# Patient Record
Sex: MALE | Race: WHITE | NOT HISPANIC OR LATINO | Employment: FULL TIME | URBAN - METROPOLITAN AREA
[De-identification: names, ages, dates, MRNs, and addresses within clinical notes are randomized per-mention and may not be internally consistent; named-entity substitution may affect disease eponyms.]

---

## 2017-01-13 DIAGNOSIS — C49.9 SARCOMA (H): Primary | ICD-10-CM

## 2017-03-30 ENCOUNTER — OFFICE VISIT (OUTPATIENT)
Dept: ORTHOPEDICS | Facility: CLINIC | Age: 52
End: 2017-03-30

## 2017-03-30 VITALS — WEIGHT: 169.2 LBS | HEIGHT: 69 IN | BODY MASS INDEX: 25.06 KG/M2

## 2017-03-30 DIAGNOSIS — C49.11 SOFT TISSUE SARCOMA OF UPPER EXTREMITY, RIGHT (H): Primary | ICD-10-CM

## 2017-03-30 NOTE — PROGRESS NOTES
[facility], Orthopaedic Oncology Surgery  by Raleigh Alvarado M.D.    Alphonso Hicks MRN# [id number]   Age: 50 year old YOB: 1965     Requesting physician: Mira Eid MD Gen Surgery, MD Prieto Valencia MD, PCP       DX:  1. High grade myxofibrosarcoma, Right arm. Tumor size is 3 cm, superficial, high grade    TREATMENTS:  1. 11/11/2015, Right arm sarcoma removal, (Dr. Eid) [facility]  2. External beam RT, 50 Gy, (Dr. Hugo Coe) [place]  3. 2/26/2016, Wide excision of tumor bed from previously excised myxofibrosarcoma (Christiano) [facility]    Mr. Hicks returns for routine surveillance after treatment of his R arm soft tissue sarcoma.    EXAM: elbow ROM 0-140 deg.  Forearm sup/pronation 90/90  Incision healed.  Skin with some post radiation discoloration and change in suppleness, minimal fibrosis.    CT lungs:  No evidence of mets    IMP:  Excellent outcome.  Remains disease free to date.    PLAN:    1. RTC per high grade tumor f/u surveillance protocol.        MD Isabel Siu Family Professor  Oncology and Adult Reconstructive Surgery  Dept Orthopaedic Surgery, [facility]  [phone] office, [phone] pager  [email or web address]

## 2017-03-30 NOTE — MR AVS SNAPSHOT
"              After Visit Summary   3/30/2017    Alphonso Hicks    MRN: [id number]           Patient Information     Date Of Birth          1965        Visit Information        Provider Department      3/30/2017 11:15 AM Raleigh Alvarado MD [facility] Orthopaedic Clinic        Today's Diagnoses     Soft tissue sarcoma of upper extremity, right (H)    -  1       Follow-ups after your visit        Who to contact     Please call your clinic at [phone] to:    Ask questions about your health    Make or cancel appointments    Discuss your medicines    Learn about your test results    Speak to your doctor   If you have compliments or concerns about an experience at your clinic, or if you wish to file a complaint, please contact [facility] Patient Relations at [phone] or email us at [email or web address]         Additional Information About Your Visit        MyChart Information     TheTaket is an electronic gateway that provides easy, online access to your medical records. With [employer], you can request a clinic appointment, read your test results, renew a prescription or communicate with your care team.     To sign up for The[employer]et visit the website at [email or web address]   You will be asked to enter the access code listed below, as well as some personal information. Please follow the directions to create your username and password.     Your access code is: [email or web address]  Expires: 2017  6:30 AM     Your access code will  in 90 days. If you need help or a new code, please contact your [facility] Clinic or call [phone] for assistance.        Care EveryWhere ID     This is your Care EveryWhere ID. This could be used by other organizations to access your [place] medical records  [id number]        Your Vitals Were     Height BMI (Body Mass Index)                1.753 m (5' 9\") 24.99 kg/m2           Blood Pressure from Last 3 " Encounters:   No data found for BP    Weight from Last 3 Encounters:   No data found for Wt              Today, you had the following     No orders found for display       Primary Care Provider Office Phone # Fax #    Prieto Keller [phone]       [facility] [address] [facility] [address]        Thank you!     Thank you for choosing [facility] ORTHOPAEDIC CLINIC  for your care. Our goal is always to provide you with excellent care. Hearing back from our patients is one way we can continue to improve our services. Please take a few minutes to complete the written survey that you may receive in the mail after your visit with us. Thank you!             Your Updated Medication List - Protect others around you: Learn how to safely use, store and throw away your medicines at www.disposemymeds.org.      Notice  As of 3/30/2017 11:59 PM    You have not been prescribed any medications.

## 2017-03-30 NOTE — NURSING NOTE
"Reason For Visit:   Chief Complaint   Patient presents with     Results     Same Day Chest CT Scan S/p S/P Excision of Right Elbow Tumor Bed with Soft Tissue Flap DOS: 2/26/16           Ht 1.753 m (5' 9\")  Wt 76.7 kg (169 lb 3.2 oz)  BMI 24.99 kg/m2                      Pain Assessment  Patient Currently in Pain: No               No current outpatient prescriptions on file.     No current facility-administered medications for this visit.        No Known Allergies    Lisa Horner C.M.A.             "

## 2017-03-30 NOTE — LETTER
3/30/2017       RE: Alphonso Hicks  [address] [facility] [address]     Dear Colleague,    Thank you for referring your patient, Alphonso Hicks, to the [facility] ORTHOPAEDIC [facility]INIC at [facility]. Please see a copy of my visit note below.          [facility] Physicians, Orthopaedic Oncology Surgery  by Raleigh Alvarado M.D.    Alphonso Hicks MRN# [id number]   Age: 50 year old YOB: 1965     Requesting physician: Mira Eid MD Gen Surgery, MD Prieto Valencia MD, PCP       DX:  1. High grade myxofibrosarcoma, Right arm. Tumor size is 3 cm, superficial, high grade    TREATMENTS:  1. 11/11/2015, Right arm sarcoma removal, (Dr. iEd) [facility]  2. External beam RT, 50 Gy, (Dr. Hugo Coe) [place]  3. 2/26/2016, Wide excision of tumor bed from previously excised myxofibrosarcoma (Christiano) [facility]    Mr. Hicks returns for routine surveillance after treatment of his R arm soft tissue sarcoma.    EXAM: elbow ROM 0-140 deg.  Forearm sup/pronation 90/90  Incision healed.  Skin with some post radiation discoloration and change in suppleness, minimal fibrosis.    CT lungs:  No evidence of mets    IMP:  Excellent outcome.  Remains disease free to date.    PLAN:    1. RTC per high grade tumor f/u surveillance protocol.        MD Isabel Siu Professor  Oncology and Adult Reconstructive Surgery  Dept Orthopaedic Surgery, [facility]  [phone] office, [phone] pager  [email or web address]

## 2017-06-05 DIAGNOSIS — C49.9 SARCOMA (H): Primary | ICD-10-CM

## 2017-07-13 ENCOUNTER — OFFICE VISIT (OUTPATIENT)
Dept: ORTHOPEDICS | Facility: CLINIC | Age: 52
End: 2017-07-13

## 2017-07-13 VITALS — BODY MASS INDEX: 25.02 KG/M2 | HEIGHT: 69 IN | WEIGHT: 168.9 LBS

## 2017-07-13 DIAGNOSIS — C49.11 SOFT TISSUE SARCOMA OF UPPER EXTREMITY, RIGHT (H): Primary | ICD-10-CM

## 2017-07-13 NOTE — PROGRESS NOTES
[facility] Physicians, Orthopaedic Oncology Surgery  by Raleigh Alvarado M.D.    Alphonso Hicks MRN# [id number]   Age: 50 year old YOB: 1965     Requesting physician: Mira Eid MD Gen Surgery, MD Prieto Valencia MD, PCP       DX:  1. High grade myxofibrosarcoma, Right arm. Tumor size is 3 cm, superficial, high grade    TREATMENTS:  1. 11/11/2015, Right arm sarcoma removal, (Dr. Eid) [facility]  2. External beam RT, 50 Gy, (Dr. Hugo Coe) [place]  3. 2/26/2016, Wide excision of tumor bed from previously excised myxofibrosarcoma (Christiano) [facility]    Mr. Hicks returns for routine surveillance after treatment of his R arm soft tissue sarcoma.    EXAM: elbow ROM 0-140 deg.  Forearm sup/pronation 90/90  Incision healed.  Skin with some post radiation discoloration and change in suppleness, minimal fibrosis.    CT lungs:  No evidence of mets, radiology report pending.    IMP:  Excellent outcome.  Remains disease free to date 1.5 years post treatment. Discussed usage of a sleeve to avoid UV light on surgical site.    PLAN:    1. RTC per high grade tumor f/u surveillance protocol.      Raleigh Alvarado MD  [facility] Family Professor  Oncology and Adult Reconstructive Surgery  Dept Orthopaedic Surgery, [facility]  [phone] office, [phone] pager  [email or web address]    Total Time = 15 min, 50% of which was spent in counseling and coordination of care as documented above.        Answers for HPI/ROS submitted by the patient on 7/10/2017   General Symptoms: No  Skin Symptoms: No  HENT Symptoms: No  EYE SYMPTOMS: No  HEART SYMPTOMS: No  LUNG SYMPTOMS: No  INTESTINAL SYMPTOMS: No  URINARY SYMPTOMS: No  REPRODUCTIVE SYMPTOMS: No  SKELETAL SYMPTOMS: No  BLOOD SYMPTOMS: No  NERVOUS SYSTEM SYMPTOMS: No  MENTAL HEALTH SYMPTOMS: No

## 2017-07-13 NOTE — MR AVS SNAPSHOT
After Visit Summary   7/13/2017    Alphonso Hicks    MRN: [id number]           Patient Information     Date Of Birth          1965        Visit Information        Provider Department      7/13/2017 10:30 AM Raleigh Alvarado MD [facility] Orthopaedic [facility]        Today's Diagnoses     Soft tissue sarcoma of upper extremity, right (H)    -  1       Follow-ups after your visit        Your next 10 appointments already scheduled     Nov 16, 2017 10:30 AM CST   (Arrive by 10:15 AM)   RETURN TUMOR VISIT with Raleigh Alvarado MD   [facility] Orthopaedic [facility] ([facility] [place])    [address]  [address] [facility] [address] [phone]              Who to contact     Please call your clinic at [phone] to:    Ask questions about your health    Make or cancel appointments    Discuss your medicines    Learn about your test results    Speak to your doctor   If you have compliments or concerns about an experience at your clinic, or if you wish to file a complaint, please contact [facility] Patient Relations at [phone] or email us at [email or web address]         Additional Information About Your Visit        MyChart Information     [employer] gives you secure access to your electronic health record. If you see a primary care provider, you can also send messages to your care team and make appointments. If you have questions, please call your primary care clinic.  If you do not have a primary care provider, please call [phone] and they will assist you.      [employer] is an electronic gateway that provides easy, online access to your medical records. With [employer], you can request a clinic appointment, read your test results, renew a prescription or communicate with your care team.     To access your existing account, please contact your [facility] or call [phone] for assistance.        Care  "EveryWhere ID     This is your Care EveryWhere ID. This could be used by other organizations to access your [place] medical records  [id number]        Your Vitals Were     Height BMI (Body Mass Index)                1.753 m (5' 9\") 24.94 kg/m2           Blood Pressure from Last 3 Encounters:   02/26/16 134/86   12/03/15 148/89    Weight from Last 3 Encounters:   07/13/17 76.6 kg (168 lb 14.4 oz)   03/30/17 76.7 kg (169 lb 3.2 oz)   11/14/16 74.8 kg (165 lb)              Today, you had the following     No orders found for display       Primary Care Provider Office Phone # Fax #    Prieto MCMAHAN Arianna [phone]       [facility] [address]  [facility] [address]        Equal Access to Services     RICARDO GUEVARA : Hadii aad ku hadasho Soomaali, waaxda luqadaha, qaybta kaalmada adeegyada, joni mcclure . So [facility] [phone].    ATENCIÓN: Si habla español, tiene a brgiht disposición servicios gratuitos de asistencia lingüística. Llame al [phone].    We comply with applicable federal civil rights laws and [place] laws. We do not discriminate on the basis of race, color, national origin, age, disability sex, sexual orientation or gender identity.            Thank you!     Thank you for choosing [facility] ORTHOPAEDIC CLINIC  for your care. Our goal is always to provide you with excellent care. Hearing back from our patients is one way we can continue to improve our services. Please take a few minutes to complete the written survey that you may receive in the mail after your visit with us. Thank you!             Your Updated Medication List - Protect others around you: Learn how to safely use, store and throw away your medicines at www.disposemymeds.org.      Notice  As of 7/13/2017 10:55 AM    You have not been prescribed any medications.      "

## 2017-07-13 NOTE — LETTER
7/13/2017       RE: Alphonso Hicks  [address] [facility] [address]     Dear Colleague,    Thank you for referring your patient, Alphonso Hicks, to the [facility] ORTHOPAEDIC CLINIC at [facility]. Please see a copy of my visit note below.    [facility] Physicians, Orthopaedic Oncology Surgery  by Raleigh Alvarado M.D.    Alphonso Hicks MRN# [id number]   Age: 50 year old YOB: 1965     Requesting physician: Mira Eid MD Gen Surgery, MD Prieto Valencia MD, PCP       DX:  1. High grade myxofibrosarcoma, Right arm. Tumor size is 3 cm, superficial, high grade    TREATMENTS:  1. 11/11/2015, Right arm sarcoma removal, (Dr. Eid) [facility]  2. External beam RT, 50 Gy, (Dr. Hugo Coe) [place]  3. 2/26/2016, Wide excision of tumor bed from previously excised myxofibrosarcoma (Christiano) [facility]    Mr. Hicks returns for routine surveillance after treatment of his R arm soft tissue sarcoma.    EXAM: elbow ROM 0-140 deg.  Forearm sup/pronation 90/90  Incision healed.  Skin with some post radiation discoloration and change in suppleness, minimal fibrosis.    CT lungs:  No evidence of mets, radiology report pending.    IMP:  Excellent outcome.  Remains disease free to date 1.5 years post treatment. Discussed usage of a sleeve to avoid UV light on surgical site.    PLAN:    1. RTC per high grade tumor f/u surveillance protocol.    Total Time = 15 min, 50% of which was spent in counseling and coordination of care as documented above.    Again, thank you for allowing me to participate in the care of your patient.      Sincerely,    Raleigh Alvarado MD

## 2017-11-02 DIAGNOSIS — C49.9 SARCOMA (H): Primary | ICD-10-CM

## 2017-11-16 ENCOUNTER — OFFICE VISIT (OUTPATIENT)
Dept: ORTHOPEDICS | Facility: CLINIC | Age: 52
End: 2017-11-16

## 2017-11-16 VITALS — HEIGHT: 69 IN | WEIGHT: 175.7 LBS | BODY MASS INDEX: 26.02 KG/M2

## 2017-11-16 DIAGNOSIS — C49.11 SOFT TISSUE SARCOMA OF UPPER EXTREMITY, RIGHT (H): Primary | ICD-10-CM

## 2017-11-16 NOTE — LETTER
11/16/2017       RE: Alphonso Hicks  [address] [facility] [address]     Dear Colleague,    Thank you for referring your patient, Alphonso Hicks, to the [facility] ORTHOPAEDIC CLINIC at [facility]. Please see a copy of my visit note below.          [facility] Physicians, Orthopaedic Oncology Surgery  by Raleigh Alvarado M.D.    Alphonso Hicks MRN# [id number]   Age: 50 year old YOB: 1965     Requesting physician: Mira Eid MD Gen Surgery, MD Prieto Valencia MD, PCP       DX:  1. High grade myxofibrosarcoma, Right arm. Tumor size is 3 cm, superficial, high grade    TREATMENTS:  1. 11/11/2015, Right arm sarcoma removal, (Dr. Eid) [facility]  2. External beam RT, 50 Gy, (Dr. Hugo Coe) [place]  3. 2/26/2016, Wide excision of tumor bed from previously excised myxofibrosarcoma (Christiano) [facility]    Mr. Hicks returns for routine surveillance after treatment of his R arm soft tissue sarcoma.    EXAM: elbow ROM 0-140 deg.  Forearm sup/pronation 90/90  Incision healed.  Skin with some post radiation discoloration and change in suppleness, minimal fibrosis.    CT lungs today:  No evidence of mets    IMP:  Excellent outcome.  Remains continuously disease free to date 2 years post treatment.    PLAN:    1. RTC per high grade tumor f/u surveillance protocol       MD Isabel Siu Professor  Oncology and Adult Reconstructive Surgery  Dept Orthopaedic Surgery, [facility], located within [facility]  [phone] office, [phone] pager  [email or web address]    Total Time = 15 min, 50% of which was spent in counseling and coordination of care as documented above.      Again, thank you for allowing me to participate in the care of your patient.      Sincerely,    Raleigh Alvarado MD

## 2017-11-16 NOTE — MR AVS SNAPSHOT
"              After Visit Summary   11/16/2017    Alphonso Hicks    MRN: [id number]           Patient Information     Date Of Birth          1965        Visit Information        Provider Department      11/16/2017 10:30 AM Raleigh Alvarado MD [facility] Orthopaedic Clinic        Today's Diagnoses     Soft tissue sarcoma of upper extremity, right (H)    -  1       Follow-ups after your visit        Who to contact     Please call your clinic at [phone] to:    Ask questions about your health    Make or cancel appointments    Discuss your medicines    Learn about your test results    Speak to your doctor   If you have compliments or concerns about an experience at your clinic, or if you wish to file a complaint, please contact [facility] Patient Relations at [phone] or email us at [email or web address]         Additional Information About Your Visit        MyChart Information     [employer] gives you secure access to your electronic health record. If you see a primary care provider, you can also send messages to your care team and make appointments. If you have questions, please call your primary care clinic.  If you do not have a primary care provider, please call [phone] and they will assist you.      [employer] is an electronic gateway that provides easy, online access to your medical records. With [employer], you can request a clinic appointment, read your test results, renew a prescription or communicate with your care team.     To access your existing account, please contact your [facility] Clinic or call [phone] for assistance.        Care EveryWhere ID     This is your Care EveryWhere ID. This could be used by other organizations to access your [place] medical records  [id number]        Your Vitals Were     Height BMI (Body Mass Index)                1.753 m (5' 9\") 25.95 kg/m2           Blood Pressure from Last 3 Encounters: "   02/26/16 134/86   12/03/15 148/89    Weight from Last 3 Encounters:   11/16/17 79.7 kg (175 lb 11.2 oz)   07/13/17 76.6 kg (168 lb 14.4 oz)   03/30/17 76.7 kg (169 lb 3.2 oz)              Today, you had the following     No orders found for display       Primary Care Provider Office Phone # Fax #    Prieto Keller [phone]       [facility] [address]  [facility] [address]        Equal Access to Services     RICARDO GUEVARA : Hadii jen deluna Sojoe, waaxda luqadaha, qaybshirley kaalmakimmie tan, joni shaw. So [facility] [phone].    ATENCIÓN: Si habla español, tiene a bright disposición servicios gratuitos de asistencia lingüística. Llame al [phone].    We comply with applicable federal civil rights laws and [place] laws. We do not discriminate on the basis of race, color, national origin, age, disability, sex, sexual orientation, or gender identity.            Thank you!     Thank you for choosing [facility] ORTHOPAEDIC CLINIC  for your care. Our goal is always to provide you with excellent care. Hearing back from our patients is one way we can continue to improve our services. Please take a few minutes to complete the written survey that you may receive in the mail after your visit with us. Thank you!             Your Updated Medication List - Protect others around you: Learn how to safely use, store and throw away your medicines at www.disposemymeds.org.      Notice  As of 11/16/2017 12:47 PM    You have not been prescribed any medications.

## 2017-11-16 NOTE — PROGRESS NOTES
[facility] Physicians, Orthopaedic Oncology Surgery  by Raleigh Alvarado M.D.    Alphonso Hicks MRN# [id number]   Age: 50 year old YOB: 1965     Requesting physician: Mira Eid MD Gen Surgery, MD Prieto Valencia MD, PCP       DX:  1. High grade myxofibrosarcoma, Right arm. Tumor size is 3 cm, superficial, high grade    TREATMENTS:  1. 11/11/2015, Right arm sarcoma removal, (Dr. Eid) [facility]  2. External beam RT, 50 Gy, (Dr. Hugo Coe) [place]  3. 2/26/2016, Wide excision of tumor bed from previously excised myxofibrosarcoma (Christiano) [facility]    Mr. Hicks returns for routine surveillance after treatment of his R arm soft tissue sarcoma.    EXAM: elbow ROM 0-140 deg.  Forearm sup/pronation 90/90  Incision healed.  Skin with some post radiation discoloration and change in suppleness, minimal fibrosis.    CT lungs today:  No evidence of mets    IMP:  Excellent outcome.  Remains continuously disease free to date 2 years post treatment.    PLAN:    1. RTC per high grade tumor f/u surveillance protocol       Raleigh Alvarado MD  [facility] Family Professor  Oncology and Adult Reconstructive Surgery  Dept Orthopaedic Surgery, [facility] Physicians  [phone] office, [phone] pager  [email or web address]    Total Time = 15 min, 50% of which was spent in counseling and coordination of care as documented above.      Answers for HPI/ROS submitted by the patient on 7/10/2017   General Symptoms: No  Skin Symptoms: No  HENT Symptoms: No  EYE SYMPTOMS: No  HEART SYMPTOMS: No  LUNG SYMPTOMS: No  INTESTINAL SYMPTOMS: No  URINARY SYMPTOMS: No  REPRODUCTIVE SYMPTOMS: No  SKELETAL SYMPTOMS: No  BLOOD SYMPTOMS: No  NERVOUS SYSTEM SYMPTOMS: No  MENTAL HEALTH SYMPTOMS: No

## 2017-11-16 NOTE — NURSING NOTE
"Chief Complaint   Patient presents with     Results     F/U Same day Chest CT S/p Excision of Right Elbow Tumor Bed with Soft Tissue Flap DOS: 2/26/16       52 year old  1965    Ht 1.753 m (5' 9\")  Wt 79.7 kg (175 lb 11.2 oz)  BMI 25.95 kg/m2            [facility] [address] [place] [address]    No Known Allergies  No current outpatient prescriptions on file.     No current facility-administered medications for this visit.            Lisa Horner C.M.A.       "

## 2019-02-11 ENCOUNTER — MYC MEDICAL ADVICE (OUTPATIENT)
Dept: ORTHOPEDICS | Facility: CLINIC | Age: 54
End: 2019-02-11

## 2019-04-04 ENCOUNTER — DOCUMENTATION ONLY (OUTPATIENT)
Dept: CARE COORDINATION | Facility: CLINIC | Age: 54
End: 2019-04-04

## 2019-04-04 DIAGNOSIS — C49.9 SARCOMA (H): Primary | ICD-10-CM

## 2019-07-01 ENCOUNTER — OFFICE VISIT (OUTPATIENT)
Dept: ORTHOPEDICS | Facility: CLINIC | Age: 54
End: 2019-07-01
Payer: COMMERCIAL

## 2019-07-01 ENCOUNTER — ANCILLARY PROCEDURE (OUTPATIENT)
Dept: CT IMAGING | Facility: CLINIC | Age: 54
End: 2019-07-01
Attending: ORTHOPAEDIC SURGERY
Payer: COMMERCIAL

## 2019-07-01 DIAGNOSIS — C49.9 SARCOMA (H): Primary | ICD-10-CM

## 2019-07-01 DIAGNOSIS — C49.9 SARCOMA (H): ICD-10-CM

## 2019-07-01 DIAGNOSIS — C49.11 SOFT TISSUE SARCOMA OF UPPER EXTREMITY, RIGHT (H): ICD-10-CM

## 2019-07-01 NOTE — PROGRESS NOTES
[facility] Physicians, Orthopaedic Oncology Surgery Consultation  by Raleigh Alvarado M.D.    Alphonso Hicks MRN# [id number]   Age: 53 year old YOB: 1965     Requesting physician: Prieto Santos            Assessment and Plan:   Assessment: Excellent outcome.  Remains continuously disease free to date 3.5 years post treatment.     Plan:    Follow-up with me in one year for a routine surveillance visit.            History of Present Illness:   53 year old male who presents for a follow up of right arm soft tissue sarcoma. I last evaluated him on 11/16/17, at which time he was noted to be continuously disease free 2 years post treatment. Today, he returns for a routine surveillance after treatment of his right arm soft tissue sarcoma. He notes that he has began working out and this has caused some weakness in his right arm, but he is not concerned with this. He is able to perform his daily activities without any pain. He voices no further concerns.     Background history:  DX:  1. High grade myxofibrosarcoma, Right arm. Tumor size is 3 cm, superficial, high grade     TREATMENTS:  1. 11/11/2015, Right arm sarcoma removal, (Dr. Eid) [facility]  2. External beam RT, 50 Gy, (Dr. Hugo Coe) [facility]  3. 2/26/2016, Wide excision of tumor bed from previously excised myxofibrosarcoma (Christiano) [facility]         Physical Exam:     EXAMINATION pertinent findings:   VITAL SIGNS: There were no vitals taken for this visit.  There is no height or weight on file to calculate BMI.  RESP: non labored breathing   ABD: benign   SKIN: grossly normal   LYMPHATIC: grossly normal   NEURO: grossly normal   VASCULAR: satisfactory perfusion of all extremities   MUSCULOSKELETAL: elbow ROM 0-140 deg.  Forearm sup/pronation 90/90  Incision healed.  Skin with some post radiation discoloration and change in suppleness, minimal fibrosis.    Total Time = 15 min, 50% of which was spent in counseling and  coordination of care as documented above.    Scribe Disclosure:  I, Jakemoraima Damian, am serving as a scribe to document services personally performed by Raleigh Alvarado MD at this visit, based upon the provider's statements to me. All documentation has been reviewed by the aforementioned provider prior to being entered into the official medical record.       Answers for HPI/ROS submitted by the patient on 6/20/2019   General Symptoms: No  Skin Symptoms: No  HENT Symptoms: No  EYE SYMPTOMS: No  HEART SYMPTOMS: No  LUNG SYMPTOMS: No  INTESTINAL SYMPTOMS: No  URINARY SYMPTOMS: No  REPRODUCTIVE SYMPTOMS: No  SKELETAL SYMPTOMS: No  BLOOD SYMPTOMS: No  NERVOUS SYSTEM SYMPTOMS: No  MENTAL HEALTH SYMPTOMS: No      Attending MD (Dr. Raleigh Alvarado) Attestation :  This patient was seen and evaluated by me including a history, exam, and interpretation of all imaging and/or lab data.  Either a training physician (resident/fellow), who also saw the patient, or scribe has documented the visit in the attached note.    Raleigh Alvarado MD  [place] Family Professor  Oncology and Adult Reconstructive Surgery  Dept Orthopaedic Surgery, [facility]  [phone] office, [phone] pager  [email or web address]

## 2019-07-01 NOTE — LETTER
7/1/2019       RE: Alphonso Hicks  [address] [facility] [address]     Dear Colleague,    Thank you for referring your patient, Alphonso Hicks, to the [facility] at [facility]. Please see a copy of my visit note below.    [facility] Physicians, Orthopaedic Oncology Surgery Consultation  by Raleigh Alvarado M.D.    Alphonso Hicks MRN# [id number]   Age: 53 year old YOB: 1965     Requesting physician: Prieto Santos            Assessment and Plan:   Assessment: Excellent outcome.  Remains continuously disease free to date 3.5 years post treatment.     Plan:    Follow-up with me in one year for a routine surveillance visit.            History of Present Illness:   53 year old male who presents for a follow up of right arm soft tissue sarcoma. I last evaluated him on 11/16/17, at which time he was noted to be continuously disease free 2 years post treatment. Today, he returns for a routine surveillance after treatment of his right arm soft tissue sarcoma. He notes that he has began working out and this has caused some weakness in his right arm, but he is not concerned with this. He is able to perform his daily activities without any pain. He voices no further concerns.     Background history:  DX:  1. High grade myxofibrosarcoma, Right arm. Tumor size is 3 cm, superficial, high grade     TREATMENTS:  1. 11/11/2015, Right arm sarcoma removal, (Dr. Eid) [facility]  2. External beam RT, 50 Gy, (Dr. Hugo Coe) [facility]  3. 2/26/2016, Wide excision of tumor bed from previously excised myxofibrosarcoma (Christiano) [facility]         Physical Exam:     EXAMINATION pertinent findings:   VITAL SIGNS: There were no vitals taken for this visit.  There is no height or weight on file to calculate BMI.  RESP: non labored breathing   ABD: benign   SKIN: grossly normal   LYMPHATIC: grossly normal   NEURO: grossly normal   VASCULAR:  satisfactory perfusion of all extremities   MUSCULOSKELETAL: elbow ROM 0-140 deg.  Forearm sup/pronation 90/90  Incision healed.  Skin with some post radiation discoloration and change in suppleness, minimal fibrosis.    Total Time = 15 min, 50% of which was spent in counseling and coordination of care as documented above.    Scribe Disclosure:  Jake PAUL, am serving as a scribe to document services personally performed by Raleigh Alvarado MD at this visit, based upon the provider's statements to me. All documentation has been reviewed by the aforementioned provider prior to being entered into the official medical record.     Attending MD (Dr. Raleigh Alvarado) Attestation :  This patient was seen and evaluated by me including a history, exam, and interpretation of all imaging and/or lab data.  Either a training physician (resident/fellow), who also saw the patient, or scribe has documented the visit in the attached note.    MD Isabel Siu Professor  Oncology and Adult Reconstructive Surgery  Dept Orthopaedic Surgery, [facility] Physicians  [phone] office, [phone] pager  [email or web address]

## 2019-07-01 NOTE — NURSING NOTE
Chief Complaint   Patient presents with     Results     F/u Same day Chest CT S/p Excision of Right Elbow Tumor Bed with Soft Tissue Flap - Right DOS: 02/26/2016       53 year old  1965        [facility] [address] [place] [address]    No Known Allergies    No current outpatient medications on file.     No current facility-administered medications for this visit.

## 2020-03-10 ENCOUNTER — HEALTH MAINTENANCE LETTER (OUTPATIENT)
Age: 55
End: 2020-03-10

## 2020-12-27 ENCOUNTER — HEALTH MAINTENANCE LETTER (OUTPATIENT)
Age: 55
End: 2020-12-27

## 2021-04-24 ENCOUNTER — HEALTH MAINTENANCE LETTER (OUTPATIENT)
Age: 56
End: 2021-04-24

## 2021-10-04 ENCOUNTER — HEALTH MAINTENANCE LETTER (OUTPATIENT)
Age: 56
End: 2021-10-04

## 2022-05-15 ENCOUNTER — HEALTH MAINTENANCE LETTER (OUTPATIENT)
Age: 57
End: 2022-05-15

## 2022-09-11 ENCOUNTER — HEALTH MAINTENANCE LETTER (OUTPATIENT)
Age: 57
End: 2022-09-11

## 2022-12-13 ENCOUNTER — MYC MEDICAL ADVICE (OUTPATIENT)
Dept: ORTHOPEDICS | Facility: CLINIC | Age: 57
End: 2022-12-13

## 2022-12-13 NOTE — TELEPHONE ENCOUNTER
- A call was placed to the patient.     - I told patient we would like imaging done earlier than 1/12. I told him our PA's recommendations that we gets images within 2 weeks. He said he did not want to get imaging done till the beginning on 2023, but would be willing to go earlier in 2023.     -I was able to schedule his appointments on 1/2 at [facility]. Patient asked I notify him via [employer] and I did so.     - Patient verbalized understanding of plan and all questions were answered. Call back number to clinic was given and patient was told to call if they had an further questions.

## 2023-01-02 ENCOUNTER — ANCILLARY PROCEDURE (OUTPATIENT)
Dept: MRI IMAGING | Facility: CLINIC | Age: 58
End: 2023-01-02
Attending: PHYSICIAN ASSISTANT
Payer: COMMERCIAL

## 2023-01-02 DIAGNOSIS — C49.11 SOFT TISSUE SARCOMA OF UPPER EXTREMITY, RIGHT (H): ICD-10-CM

## 2023-01-02 PROCEDURE — 255N000002 HC RX 255 OP 636: Performed by: PHYSICIAN ASSISTANT

## 2023-01-02 PROCEDURE — A9585 GADOBUTROL INJECTION: HCPCS | Performed by: PHYSICIAN ASSISTANT

## 2023-01-02 PROCEDURE — 73223 MRI JOINT UPR EXTR W/O&W/DYE: CPT | Mod: RT

## 2023-01-02 PROCEDURE — 72156 MRI NECK SPINE W/O & W/DYE: CPT

## 2023-01-02 RX ORDER — GADOBUTROL 604.72 MG/ML
8.5 INJECTION INTRAVENOUS ONCE
Status: COMPLETED | OUTPATIENT
Start: 2023-01-02 | End: 2023-01-02

## 2023-01-02 RX ADMIN — GADOBUTROL 8.5 ML: 604.72 INJECTION INTRAVENOUS at 06:50

## 2023-01-12 ENCOUNTER — MYC MEDICAL ADVICE (OUTPATIENT)
Dept: ORTHOPEDICS | Facility: CLINIC | Age: 58
End: 2023-01-12

## 2023-01-12 ENCOUNTER — TRANSCRIBE ORDERS (OUTPATIENT)
Dept: ONCOLOGY | Facility: CLINIC | Age: 58
End: 2023-01-12

## 2023-01-12 ENCOUNTER — PATIENT OUTREACH (OUTPATIENT)
Dept: ONCOLOGY | Facility: CLINIC | Age: 58
End: 2023-01-12

## 2023-01-12 ENCOUNTER — OFFICE VISIT (OUTPATIENT)
Dept: ORTHOPEDICS | Facility: CLINIC | Age: 58
End: 2023-01-12
Payer: COMMERCIAL

## 2023-01-12 ENCOUNTER — ANCILLARY PROCEDURE (OUTPATIENT)
Dept: CT IMAGING | Facility: CLINIC | Age: 58
End: 2023-01-12
Attending: PHYSICIAN ASSISTANT
Payer: COMMERCIAL

## 2023-01-12 VITALS — HEIGHT: 69 IN | BODY MASS INDEX: 25.92 KG/M2 | WEIGHT: 175 LBS

## 2023-01-12 DIAGNOSIS — C49.11 SOFT TISSUE SARCOMA OF UPPER EXTREMITY, RIGHT (H): Primary | ICD-10-CM

## 2023-01-12 DIAGNOSIS — R91.8 PULMONARY NODULES: Primary | ICD-10-CM

## 2023-01-12 PROCEDURE — 71250 CT THORAX DX C-: CPT | Mod: GC | Performed by: RADIOLOGY

## 2023-01-12 PROCEDURE — 99204 OFFICE O/P NEW MOD 45 MIN: CPT | Performed by: ORTHOPAEDIC SURGERY

## 2023-01-12 NOTE — NURSING NOTE
"Chief Complaint   Patient presents with     RECHECK     Pt found a new mass or swelling on their neck mass along vertebrae and surveillance of high grade myxofibrosarcoma 2016. Pt noticed the mass around 12/13/2022. Pt had MRIs and CT on 01/02/2023 and 01/12/2023 respectively.       57 year old  1965    Ht 1.753 m (5' 9\")   Wt 79.4 kg (175 lb)   BMI 25.84 kg/m      Past Surgical History:   Procedure Laterality Date     APPENDECTOMY       ARTHROSCOPIC RECONSTRUCTION ANTERIOR CRUCIATE LIGAMENT Right      ELBOW SURGERY       EXCISE SOFT TISSUE TUMOR ELBOW Right 2/26/2016    Procedure: EXCISE SOFT TISSUE TUMOR ELBOW;  Surgeon: Raleigh Alvarado MD;  Location: [facility] OR     KNEE SURGERY               Pain Assessment  Patient Currently in Pain: Denies            [facility] [address] [place] [address]        No Known Allergies        No current outpatient medications on file.     No current facility-administered medications for this visit.             Questionnaires:    HOOS Hip Dysfunction & Osteoarthritis Outcome Questionnaire    No flowsheet data found.           KOOS Knee Survey Assessment    No flowsheet data found.           Promis 10 Assessment    PROMIS 10 1/9/2023   In general, would you say your health is: Very good   In general, would you say your quality of life is: Very good   In general, how would you rate your physical health? Good   In general, how would you rate your mental health, including your mood and your ability to think? Excellent   In general, how would you rate your satisfaction with your social activities and relationships? Excellent   In general, please rate how well you carry out your usual social activities and roles Excellent   To what extent are you able to carry out your everyday physical activities such as walking, climbing stairs, carrying groceries, or moving a chair? Completely   How often have you been bothered by emotional problems such as feeling anxious, " depressed or irritable? Never   How would you rate your fatigue on average? None   How would you rate your pain on average?   0 = No Pain  to  10 = Worst Imaginable Pain 0   In general, would you say your health is: 4   In general, would you say your quality of life is: 4   In general, how would you rate your physical health? 3   In general, how would you rate your mental health, including your mood and your ability to think? 5   In general, how would you rate your satisfaction with your social activities and relationships? 5   In general, please rate how well you carry out your usual social activities and roles. (This includes activities at home, at work and in your community, and responsibilities as a parent, child, spouse, employee, friend, etc.) 5   To what extent are you able to carry out your everyday physical activities such as walking, climbing stairs, carrying groceries, or moving a chair? 5   In the past 7 days, how often have you been bothered by emotional problems such as feeling anxious, depressed, or irritable? 1   In the past 7 days, how would you rate your fatigue on average? 1   In the past 7 days, how would you rate your pain on average, where 0 means no pain, and 10 means worst imaginable pain? 0   Global Mental Health Score 19   Global Physical Health Score 18   PROMIS TOTAL - SUBSCORES 37   Some recent data might be hidden              Ortho [place] Knee Questionnaire    No flowsheet data found.

## 2023-01-12 NOTE — PROGRESS NOTES
New Patient: Interventional Pulmonary (Lung nodule) Nurse Navigator Note    Referring provider:   Raleigh Alvarado MD [facility]     Referred to (specialty): Interventional Pulmonary (Lung nodule)    Requested provider (if applicable): n/a    Date Referral Received: 1/12/2023    Evaluation for :  Lung nodule -parenchymal lesion within the left upper lung field and additional endobronchial lesions of the right lower lung fields      Clinical History (per Nurse review of records provided):    1/11/2023:  CT Chest Low Dose Non Contrast   IMPRESSION:  1. New clustered/branching nodular opacities in the left upper and  right lower lobes with additional new scattered 2 mm solid pulmonary  nodules. There are additionally new mildly prominent left upper lobe  and mediastinal lymph nodes. Although these could be infectious  (including atypical) or inflammatory in etiology, mucous plugs,  short-term follow-up within 6 weeks is recommended to exclude  metastases.     2. New elevation of the right hemidiaphragm of unknown etiology.    Records Location (Care Everywhere, Media, etc.): Epic     Records Needed: none    Additional testing needed prior to consult: PFT's

## 2023-01-12 NOTE — LETTER
1/12/2023         RE: Alphonso Hicks  [address] [address] [facility] [place] [address]        Dear Colleague,    Thank you for referring your patient, Alphonso Hicks, to the [facility] [place]. Please see a copy of my visit note below.        [facility] Physicians, Orthopaedic Oncology Surgery Consultation  by Raleigh Alvarado M.D.    Alphonso Hicks MRN# [id number]   Age: 53 year old YOB: 1965     Requesting physician: Prieto Santos     Background history:  DX:  1. High grade myxofibrosarcoma, Right arm. Tumor size is 3 cm, superficial, high grade     TREATMENTS:  1. [facility]11[facility]2015[facility]  2. External beam RT, 50 Gy, (Dr. Hugo Coe) [place]  3. 2/26/2016, Wide excision of tumor bed from previously excised myxofibrosarcoma (Christiano) [facility]      Mass along back of neck since 12/2022, noticed by his daughter and wanted this evaluated.  Patient is now been seen and was lost to follow-up for the last 3 years.  Patient states this was mainly due to COVID.  He denies any symptoms of discomfort.  He has no problems with the right elbow.  He does feel some clicking within his right shoulder region that he thinks is related to recent weightlifting activity.    Examination:  Right upper extremity shows postsurgical and postradiation changes in the region around his left elbow.  No palpable evidence of mass or tumor recurrence.  He can fully extend the elbow with further flexion under 30 degrees.  No restriction of forearm rotation either.    Posterior neck shows no evidence of palpable masses.  Some swelling over the T1 spinous process.  No fluctuant areas.  No restriction in range of motion of his cervical spine.    Imaging studies:  Cervical spine MRI shows degenerative changes only.  No evidence of mass present.  MRI examination of the right upper extremity shows no evidence of tumor recurrence full extent beyond the  elbow not quite completely visualized.  Chest CT examination demonstrates parenchymal lesion within the left upper lung field and additional endobronchial lesions of the right lower lung fields.  I reviewed this with chest radiology team.  Difficult to distinguish between infectious and inflammatory versus neoplastic etiology.  Findings were not present on previous studies in 2019.  Unfortunately no interval CT examination has been performed.                Impression:  Concern for pulmonary relapse of his myxofibrosarcoma.  He has not had prior relapse before but his last follow-up surveillance visit was in 2019.  May represent unrelated pathology to his myxofibrosarcoma as well given the long disease-free interval and the superficial nature of his tumor at low risk for pulmonary relapse.    Plan:  Will refer new lung findings to lung nodule clinic for opinion regarding biopsy versus observation.  I will ask her nurse Dane Cleaning to contact patient with the opinion of the lung nodule clinic specialists.    MD Isabel Siu Family Professor  Oncology and Adult Reconstructive Surgery  Dept Orthopaedic Surgery, [facility] Physicians  [phone] office, [phone] pager  [email or web address]      Total combined visit time and work time before and after clinic visit on encounter date = 30 min

## 2023-01-12 NOTE — PROGRESS NOTES
[facility] Physicians, Orthopaedic Oncology Surgery Consultation  by Raleigh Alvarado M.D.    Alphonso Hicks MRN# [id number]   Age: 53 year old YOB: 1965     Requesting physician: Prieto Santos     Background history:  DX:  1. High grade myxofibrosarcoma, Right arm. Tumor size is 3 cm, superficial, high grade     TREATMENTS:  1. 11/11/2015, Right arm sarcoma removal, (Dr. Eid) [facility]  2. External beam RT, 50 Gy, (Dr. Hugo Coe) [place]  3. 2/26/2016, Wide excision of tumor bed from previously excised myxofibrosarcoma (Christiano) [facility]      Mass along back of neck since 12/2022, noticed by his daughter and wanted this evaluated.  Patient is now been seen and was lost to follow-up for the last 3 years.  Patient states this was mainly due to COVID.  He denies any symptoms of discomfort.  He has no problems with the right elbow.  He does feel some clicking within his right shoulder region that he thinks is related to recent weightlifting activity.    Examination:  Right upper extremity shows postsurgical and postradiation changes in the region around his left elbow.  No palpable evidence of mass or tumor recurrence.  He can fully extend the elbow with further flexion under 30 degrees.  No restriction of forearm rotation either.    Posterior neck shows no evidence of palpable masses.  Some swelling over the T1 spinous process.  No fluctuant areas.  No restriction in range of motion of his cervical spine.    Imaging studies:  Cervical spine MRI shows degenerative changes only.  No evidence of mass present.  MRI examination of the right upper extremity shows no evidence of tumor recurrence full extent beyond the elbow not quite completely visualized.  Chest CT examination demonstrates parenchymal lesion within the left upper lung field and additional endobronchial lesions of the right lower lung fields.  I reviewed this with chest radiology team.  Difficult to distinguish  between infectious and inflammatory versus neoplastic etiology.  Findings were not present on previous studies in 2019.  Unfortunately no interval CT examination has been performed.                Impression:  Concern for pulmonary relapse of his myxofibrosarcoma.  He has not had prior relapse before but his last follow-up surveillance visit was in 2019.  May represent unrelated pathology to his myxofibrosarcoma as well given the long disease-free interval and the superficial nature of his tumor at low risk for pulmonary relapse.    Plan:  Will refer new lung findings to lung nodule clinic for opinion regarding biopsy versus observation.  I will ask her nurse Dane Cleaning to contact patient with the opinion of the lung nodule clinic specialists.    MD Isabel Siu Family Professor  Oncology and Adult Reconstructive Surgery  Dept Orthopaedic Surgery, [facility] Physicians  [phone] office, [phone] pager  [email or web address]      Total combined visit time and work time before and after clinic visit on encounter date = 30 min

## 2023-01-17 ENCOUNTER — MYC MEDICAL ADVICE (OUTPATIENT)
Dept: PULMONOLOGY | Facility: CLINIC | Age: 58
End: 2023-01-17
Payer: COMMERCIAL

## 2023-01-23 DIAGNOSIS — R91.8 PULMONARY NODULES: Primary | ICD-10-CM

## 2023-01-27 ENCOUNTER — TELEPHONE (OUTPATIENT)
Dept: PULMONOLOGY | Facility: CLINIC | Age: 58
End: 2023-01-27
Payer: COMMERCIAL

## 2023-01-27 NOTE — TELEPHONE ENCOUNTER
Pt had called wondering if it was okay to see Dr. Centeno and not Dr. Gonzalez. I reassured him that they are all specialists in the same area and that he was in great hands.  Pt is going to call scheduling to see if he can get a virtual visit.  I offered to send them a message but he declined.     Katerine Watts RN, BSN  Nurse Care Coordinator  Interventional Pulmonology  [phone]

## 2023-02-13 ENCOUNTER — MYC MEDICAL ADVICE (OUTPATIENT)
Dept: PULMONOLOGY | Facility: CLINIC | Age: 58
End: 2023-02-13
Payer: COMMERCIAL

## 2023-02-13 ENCOUNTER — TELEPHONE (OUTPATIENT)
Dept: PULMONOLOGY | Facility: CLINIC | Age: 58
End: 2023-02-13
Payer: COMMERCIAL

## 2023-02-13 NOTE — TELEPHONE ENCOUNTER
Patient called RN[facility] and asked if it was possible to change his 3/1 appt with Dr. Centeno to a sooner date. RNCC told pt she would forward his message to our scheduling team and they will reach out to him and try to accommodate. Patient verbalized understanding and thanked [facility] for taking his call.

## 2023-02-14 ENCOUNTER — HOSPITAL ENCOUNTER (OUTPATIENT)
Dept: CT IMAGING | Facility: CLINIC | Age: 58
Discharge: HOME | End: 2023-02-14
Attending: INTERNAL MEDICINE | Admitting: INTERNAL MEDICINE
Payer: COMMERCIAL

## 2023-02-14 ENCOUNTER — ALLIED HEALTH/NURSE VISIT (OUTPATIENT)
Dept: PULMONOLOGY | Facility: CLINIC | Age: 58
End: 2023-02-14
Attending: CLINICAL NURSE SPECIALIST
Payer: COMMERCIAL

## 2023-02-14 DIAGNOSIS — R91.8 PULMONARY NODULES: ICD-10-CM

## 2023-02-14 PROCEDURE — 94375 RESPIRATORY FLOW VOLUME LOOP: CPT | Performed by: INTERNAL MEDICINE

## 2023-02-14 PROCEDURE — 71250 CT THORAX DX C-: CPT

## 2023-02-14 PROCEDURE — 94726 PLETHYSMOGRAPHY LUNG VOLUMES: CPT | Performed by: INTERNAL MEDICINE

## 2023-02-14 PROCEDURE — 94729 DIFFUSING CAPACITY: CPT | Performed by: INTERNAL MEDICINE

## 2023-02-14 NOTE — PROGRESS NOTES
Alphonso Hicsk comes into clinic today at the request of Dr. Danial Centeno, Ordering Provider for PFT    This service provided today was under the supervising provider of the day Dr. Aguilar, who was available if needed.    Shawn Johnston, RT

## 2023-02-15 ENCOUNTER — VIRTUAL VISIT (OUTPATIENT)
Dept: PULMONOLOGY | Facility: CLINIC | Age: 58
End: 2023-02-15
Payer: COMMERCIAL

## 2023-02-15 DIAGNOSIS — R91.8 LUNG NODULES: Primary | ICD-10-CM

## 2023-02-15 LAB
DLCOUNC-%PRED-PRE: 117 %
DLCOUNC-PRE: 31.91 ML/MIN/MMHG
DLCOUNC-PRED: 27.13 ML/MIN/MMHG
ERV-%PRED-PRE: 48 %
ERV-PRE: 0.59 L
ERV-PRED: 1.22 L
EXPTIME-PRE: 6.03 SEC
FEF2575-%PRED-PRE: 121 %
FEF2575-PRE: 3.49 L/SEC
FEF2575-PRED: 2.88 L/SEC
FEFMAX-%PRED-PRE: 115 %
FEFMAX-PRE: 10.53 L/SEC
FEFMAX-PRED: 9.14 L/SEC
FEV1-%PRED-PRE: 82 %
FEV1-PRE: 2.73 L
FEV1FEV6-PRE: 85 %
FEV1FEV6-PRED: 79 %
FEV1FVC-PRE: 85 %
FEV1FVC-PRED: 79 %
FEV1SVC-PRE: 85 %
FEV1SVC-PRED: 68 %
FIFMAX-PRE: 6.43 L/SEC
FRCPLETH-%PRED-PRE: 102 %
FRCPLETH-PRE: 3.6 L
FRCPLETH-PRED: 3.53 L
FVC-%PRED-PRE: 76 %
FVC-PRE: 3.23 L
FVC-PRED: 4.2 L
IC-%PRED-PRE: 72 %
IC-PRE: 2.63 L
IC-PRED: 3.64 L
RVPLETH-%PRED-PRE: 128 %
RVPLETH-PRE: 3.02 L
RVPLETH-PRED: 2.34 L
TLCPLETH-%PRED-PRE: 89 %
TLCPLETH-PRE: 6.23 L
TLCPLETH-PRED: 6.92 L
VA-%PRED-PRE: 68 %
VA-PRE: 4.31 L
VC-%PRED-PRE: 66 %
VC-PRE: 3.21 L
VC-PRED: 4.86 L

## 2023-02-15 PROCEDURE — 99214 OFFICE O/P EST MOD 30 MIN: CPT | Mod: 95 | Performed by: INTERNAL MEDICINE

## 2023-02-15 NOTE — PROGRESS NOTES
Telephone visit    Length of telephone call 10 minutes    57-year-old man with history of sarcoma status postresection 2015, resection of local Recurrence 2016 now here after 4-year lapse in surveillance with new nodules.    No pulmonary symptoms whatsoever.    Non-smoker.    We discussed findings relative to his previous diagnosis.  I will communicate with his orthopedic surgeon regarding the risk of meds for his specific disease.  His CT is not immediately characteristic of metastases but is concerning given his known previous sarcoma.    We will discuss with surgery and IR at our Friday morning conference.  Biopsy versus metastasectomy    Danial Centeno MD

## 2023-02-16 ENCOUNTER — TEAM CONFERENCE (OUTPATIENT)
Dept: PULMONOLOGY | Facility: CLINIC | Age: 58
End: 2023-02-16
Payer: COMMERCIAL

## 2023-02-16 NOTE — TELEPHONE ENCOUNTER
Pulmonary Nodule Conference      Patient Name: Alphonso Hicks    Reason for conference discussion (brief overview): Otherwise healthy non-smoking man with history of high grade myxofibrosarcoma of the arm status postresection 2015, resection of local recurrence 2016 now here with new nodules x2-left upper lobe and right lower lobe.  New versus previous surveillance CT 2019.     Specific Question:  Bronchoscopic or CT biopsy versus sequential surgical resection.      Pertinent Histology:  NA    Referring Physician: Dr. Centeno    The patient's case was presented at the multidisciplinary conference for the above noted reason.  There was a consensus recommendation for the following actions:     Thoracic surgery consult    Case Lead:  Danial Centeno    Interventional Radiology Staff Present: Charles Brice MD

## 2023-02-17 ENCOUNTER — PATIENT OUTREACH (OUTPATIENT)
Dept: ONCOLOGY | Facility: CLINIC | Age: 58
End: 2023-02-17
Payer: COMMERCIAL

## 2023-02-17 ENCOUNTER — MYC MEDICAL ADVICE (OUTPATIENT)
Dept: PULMONOLOGY | Facility: CLINIC | Age: 58
End: 2023-02-17
Payer: COMMERCIAL

## 2023-02-17 DIAGNOSIS — R91.8 PULMONARY NODULES: Primary | ICD-10-CM

## 2023-02-17 NOTE — PROGRESS NOTES
New Patient Oncology Nurse Navigator Note     Referring provider: Dr. Danial Centeno    Referring Clinic/Organization: [facility]     Referred to: Thoracic Surgery    Requested provider (if applicable): First available - did not specify     Referral Received: 02/17/23       Evaluation for :   Diagnosis   R91.8 (ICD-10-CM) - Pulmonary nodules     Clinical History (per Nurse review of records provided):    History of sarcoma, pt of Dr. Alvarado's, s/p resection in 2015. Had local recurrence 2016 now with new lung nodules.     02/14/2023 CT chest w/o contrast (bookmarked) showed:   IMPRESSION: Clustered nodules in the left upper and right lower lobes  are unchanged from 1/12/2023. Additional diminutive 2 mm bilateral  pulmonary nodules also unchanged. Findings remain indeterminate, but  more likely inflammatory/granulomatous.      02/14/2023 PFTs completed (bookmarked)  Records Location: Epic     Writer called [facility] and discussed the referral. He agreed to 3/2 appt with Dr. Payne. He was offered an earlier appt but declined as he is out of town through Feb 28th. Writer will have NPS to call pt to finalize this appt. Clinic address and phone number given to pt.    NOEL Allen, XIOMYN, RN, LAc, OCN  [facility] Oncology Nurse Navigator  [phone] / [phone]

## 2023-02-24 NOTE — CONFIDENTIAL NOTE
RECORDS STATUS - ALL OTHER DIAGNOSIS    Pulmonary nodules  RECORDS RECEIVED FROM: [facility]/ [facility]    DATE RECEIVED: 3/2/2023    Action    Action Taken 2/24/2023 12:40PM NINA PAUL called [employer] Ph: [phone] - they will push the CT scans to [facility]    3/1/2023 9:37AM NINA PAUL resolved imaging from [facility] in PACS      NOTES STATUS DETAILS   OFFICE NOTE from referring provider Complete Epic   referred by Danial Centeno MD    DISCHARGE SUMMARY from hospital     DISCHARGE REPORT from the ER     OPERATIVE REPORT Complete 2/14/2023 General PFT    CLINICAL TRIAL TREATMENTS TO DATE     LABS     PATHOLOGY REPORTS  2/26/2016  SOFT TISSUE, RIGHT LATERAL ELBOW, RE-EXCISION:   - Changes associated to previous surgical site are present and consist   of scar, patchy chronic inflammation, subcutaneous edema and   foreign-body giant cell reaction.   - No histologic evidence of residual high-grade sarcoma identified.    ANYTHING RELATED TO DIAGNOSIS     GENONOMIC TESTING     TYPE:     IMAGING (NEED IMAGES & REPORT)     CT SCANS Complete    Complete- IMG- [facility]  CT Chest 2/14/2023, 1/12/2023 more in PACS   MRI     MAMMO     ULTRASOUND     PET

## 2023-03-01 NOTE — PROGRESS NOTES
THORACIC SURGERY - NEW PATIENT OFFICE VISIT      Dear Dr. Keller,    I saw Alphonso Hicks at Dr Centeno's request in consultation for the evaluation and treatment of bilateral lung nodules with a history of sarcoma    HPI  Mr. 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 He was subsequently lost to follow-up due to the Covid pandemic and noted a new posterior neck mass in December 2022. Upon evaluation, a new parenchymal lesion was noted in the lungs concerning for recurrence of his myxofibrosarcoma. He presents for evaluation of new lung nodules.       He is healthy and functional at baseline    ECOG performance status  0- Fully active, without restriction                 Previsit Tests   PFT (2/14/2023): FEV1 82% predicted, 2.73 L, DLCO >100%    Pathology : No recent pathology  CT scan CT Chest w/o contrast (2/14/2023):   Left upper lobe 1.6cm pulmonary nodule with adjacent micronodules, with mediastinal adenopathy, with no pleural effusion   Right lower lobe 1.8x0.8cm nodule.  Multiple scattered bilateral 2mm nodules       PET scan: N/A   Brain MRI : N/A  Covid vaccination status: Vaccinated    PMH  Reviewed, as below    Past Medical History:   Diagnosis Date     Dermatitis      Sarcoma (H)     right upper extremity        PSH  Reviewed, as below    Past Surgical History:   Procedure Laterality Date     APPENDECTOMY       ARTHROSCOPIC RECONSTRUCTION ANTERIOR CRUCIATE LIGAMENT Right      ELBOW SURGERY       EXCISE SOFT TISSUE TUMOR ELBOW Right 2/26/2016    Procedure: EXCISE SOFT TISSUE TUMOR ELBOW;  Surgeon: Raleigh Alvarado MD;  Location: [facility]     KNEE SURGERY          No Known Allergies    No current outpatient medications on file.     No current facility-administered medications for this visit.       ETOH:   TOBACCO: denies  OTHER DRUGS:     Physical examination  There were  no vitals taken for this visit.     From a personal perspective, he is here with his daughter    IMPRESSION   57 year old male patient with bilateral lung nodules concerning for metastatic sarcoma.        PLAN  I spent 45 min on the date of the encounter in chart review, patient visit, review of tests, documentation and/or discussion with other providers about the issues documented above. I reviewed the plan as follows:  We discussed that the nodules seemed more diffuse and clustered than we would with mets. However, given his sarcoma history, the concern remains high.  Surgical biopsy vs IP biopsy will depend on the potential treatment plans .  I will disucss him with DR Alvarado and with our group to decide on the next best step.  We disucssed a wedge resection for diagnosis with potential for bilateral segmentectomy or lobectomy if they were mets and surgery was deemed curative    I appreciate the opportunity to participate in the care of your patient and will keep you updated.  Sincerely,    Nicolle Payne MD

## 2023-03-02 ENCOUNTER — PRE VISIT (OUTPATIENT)
Dept: SURGERY | Facility: CLINIC | Age: 58
End: 2023-03-02
Payer: COMMERCIAL

## 2023-03-02 ENCOUNTER — ONCOLOGY VISIT (OUTPATIENT)
Dept: SURGERY | Facility: CLINIC | Age: 58
End: 2023-03-02
Attending: INTERNAL MEDICINE
Payer: COMMERCIAL

## 2023-03-02 VITALS
HEART RATE: 70 BPM | HEIGHT: 69 IN | TEMPERATURE: 98 F | OXYGEN SATURATION: 98 % | BODY MASS INDEX: 26.51 KG/M2 | WEIGHT: 179 LBS | SYSTOLIC BLOOD PRESSURE: 161 MMHG | DIASTOLIC BLOOD PRESSURE: 97 MMHG

## 2023-03-02 DIAGNOSIS — R91.8 PULMONARY NODULES: ICD-10-CM

## 2023-03-02 PROCEDURE — G0463 HOSPITAL OUTPT CLINIC VISIT: HCPCS | Performed by: STUDENT IN AN ORGANIZED HEALTH CARE EDUCATION/TRAINING PROGRAM

## 2023-03-02 PROCEDURE — 99204 OFFICE O/P NEW MOD 45 MIN: CPT | Performed by: STUDENT IN AN ORGANIZED HEALTH CARE EDUCATION/TRAINING PROGRAM

## 2023-03-02 ASSESSMENT — PAIN SCALES - GENERAL: PAINLEVEL: NO PAIN (0)

## 2023-03-02 NOTE — LETTER
3/2/2023         RE: Alphonso Hicks  [id number] [facility] [address]        Dear Colleague,    Thank you for referring your patient, Alphonso Hicks, to the [facility]. Please see a copy of my visit note below.    THORACIC SURGERY - NEW PATIENT OFFICE VISIT      Dear Dr. Keller,    I saw Alphonso Hicks at Dr Centeno's request in consultation for the evaluation and treatment of bilateral lung nodules with a history of sarcoma    HPI  Mr. 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 He was subsequently lost to follow-up due to the Covid pandemic and noted a new posterior neck mass in December 2022. Upon evaluation, a new parenchymal lesion was noted in the lungs concerning for recurrence of his myxofibrosarcoma. He presents for evaluation of new lung nodules.       He is healthy and functional at baseline    ECOG performance status  0- Fully active, without restriction                 Previsit Tests   PFT (2/14/2023): FEV1 82% predicted, 2.73 L, DLCO >100%    Pathology : No recent pathology  CT scan CT Chest w/o contrast (2/14/2023):   Left upper lobe 1.6cm pulmonary nodule with adjacent micronodules, with mediastinal adenopathy, with no pleural effusion   Right lower lobe 1.8x0.8cm nodule.  Multiple scattered bilateral 2mm nodules       PET scan: N/A   Brain MRI : N/A  Covid vaccination status: Vaccinated    PMH  Reviewed, as below    Past Medical History:   Diagnosis Date     Dermatitis      Sarcoma (H)     right upper extremity        PSH  Reviewed, as below    Past Surgical History:   Procedure Laterality Date     APPENDECTOMY       ARTHROSCOPIC RECONSTRUCTION ANTERIOR CRUCIATE LIGAMENT Right      ELBOW SURGERY       EXCISE SOFT TISSUE TUMOR ELBOW Right 2/26/2016    Procedure: EXCISE SOFT TISSUE TUMOR ELBOW;  Surgeon: Raleigh Alvarado  MD;  Location: [facility]     KNEE SURGERY          No Known Allergies    No current outpatient medications on file.     No current facility-administered medications for this visit.       ETOH:   TOBACCO: denies  OTHER DRUGS:     Physical examination  There were no vitals taken for this visit.     From a personal perspective, he is here with his daughter    IMPRESSION   57 year old male patient with bilateral lung nodules concerning for metastatic sarcoma.        PLAN  I spent 45 min on the date of the encounter in chart review, patient visit, review of tests, documentation and/or discussion with other providers about the issues documented above. I reviewed the plan as follows:  We discussed that the nodules seemed more diffuse and clustered than we would with mets. However, given his sarcoma history, the concern remains high.  Surgical biopsy vs IP biopsy will depend on the potential treatment plans .  I will disucss him with DR Alvarado and with our group to decide on the next best step.  We disucssed a wedge resection for diagnosis with potential for bilateral segmentectomy or lobectomy if they were mets and surgery was deemed curative    I appreciate the opportunity to participate in the care of your patient and will keep you updated.  Sincerely,    Nicolle Payne MD

## 2023-03-02 NOTE — NURSING NOTE
"Oncology Rooming Note    March 2, 2023 10:20 AM   Alphonso Hicks is a 57 year old male who presents for:    Chief Complaint   Patient presents with     Oncology Clinic Visit     Pulmonary nodules      Initial Vitals: BP (!) 161/97 (BP Location: Right arm, Patient Position: Sitting, Cuff Size: Adult Regular)   Pulse 70   Temp 98  F (36.7  C) (Oral)   Ht 1.755 m (5' 9.09\")   Wt 81.2 kg (179 lb)   SpO2 98%   BMI 26.36 kg/m   Estimated body mass index is 26.36 kg/m  as calculated from the following:    Height as of this encounter: 1.755 m (5' 9.09\").    Weight as of this encounter: 81.2 kg (179 lb). Body surface area is 1.99 meters squared.  No Pain (0) Comment: Data Unavailable   No LMP for male patient.  Allergies reviewed: Yes  Medications reviewed: Yes    Medications: Medication refills not needed today.  Pharmacy name entered into EPIC: [facility] [id number] [place] [address]    Clinical concerns: none.       Corbin Garcia            "

## 2023-03-03 ENCOUNTER — PROCEDURE ONLY VISIT (OUTPATIENT)
Dept: PULMONOLOGY | Facility: CLINIC | Age: 58
End: 2023-03-03
Payer: COMMERCIAL

## 2023-03-03 ENCOUNTER — TEAM CONFERENCE (OUTPATIENT)
Dept: SURGERY | Facility: CLINIC | Age: 58
End: 2023-03-03
Payer: COMMERCIAL

## 2023-03-03 ENCOUNTER — PATIENT OUTREACH (OUTPATIENT)
Dept: SURGERY | Facility: CLINIC | Age: 58
End: 2023-03-03
Payer: COMMERCIAL

## 2023-03-03 DIAGNOSIS — R91.8 LUNG NODULES: ICD-10-CM

## 2023-03-03 DIAGNOSIS — R91.8 PULMONARY NODULES: Primary | ICD-10-CM

## 2023-03-03 NOTE — PROGRESS NOTES
Writer spoke with patient and discussed that he was presented at lung nodule conference this morning. Group consensus was for IP to do bx of pulmonary nodules.     Pt voiced understanding and was in agreement with plan. Pt had no further questions.    Message sent to IP RNCC and .    Brynn Blanco, RN BSN  Thoracic Surgery RN Care Coordinator  [phone]

## 2023-03-03 NOTE — PROGRESS NOTES
Dr. Park, are you able to toss a CR in for Amaury - we discussed him at conference this am looks like there is a new parenchymal lesion concerning for myxofibrosarcoma recurrence; suggested bronch + biopsy.

## 2023-03-03 NOTE — PROGRESS NOTES
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 He was subsequently lost to follow-up due to the Covid pandemic and noted a new posterior neck mass in December 2022. Upon evaluation, a new parenchymal lesion was noted in the lungs concerning for recurrence of his myxofibrosarcoma. Nodules seemed more diffuse and clustered than we would with mets. However, given his sarcoma history, the concern remains high. Dr Payne was going to talk to Dr Alvarado.    Disucssed a wedge resection for diagnosis with potential for bilateral segmentectomy or lobectomy if they were mets and surgery was deemed curative.     Specific Question:  Surgical biopsy vs IP biopsy    Pertinent Histology:  None    Referring Physician: Nicolle Payne MD    The patient's case was presented at the multidisciplinary conference for the above noted reason.  There was a consensus recommendation for the following actions:     Biopsy via bronchoscopy, to be done by IP. Brynn Blanco RNCC to call pt and relay plan.    Case Lead:  PAMELA Preciado    Interventional Radiology Staff Present: Rosa Guzman MD      
negative

## 2023-03-07 ENCOUNTER — TELEPHONE (OUTPATIENT)
Dept: PULMONOLOGY | Facility: CLINIC | Age: 58
End: 2023-03-07
Payer: COMMERCIAL

## 2023-03-07 ENCOUNTER — MYC MEDICAL ADVICE (OUTPATIENT)
Dept: PULMONOLOGY | Facility: CLINIC | Age: 58
End: 2023-03-07
Payer: COMMERCIAL

## 2023-03-07 NOTE — TELEPHONE ENCOUNTER
Pt LVM for RNCC while RNCC on another call. Regarding scheduling for his biopsy, hasn't heard from scheduling yet.    Message passed along to surgery scheduler to give pt a call when able.     Will send [employer] message that pt message was sent to our .

## 2023-03-08 ENCOUNTER — TELEPHONE (OUTPATIENT)
Dept: PULMONOLOGY | Facility: CLINIC | Age: 58
End: 2023-03-08
Payer: COMMERCIAL

## 2023-03-08 NOTE — TELEPHONE ENCOUNTER
Writer contacted patient regarding scheduling his ION procedure. Scheduled for 03/21. Sending surgery packet information via [employer] message per patient request. Patient advised that an H&P will be needed.    Byron Maxwell on 3/8/2023 at 8:56 AM

## 2023-03-09 ENCOUNTER — MYC MEDICAL ADVICE (OUTPATIENT)
Dept: PULMONOLOGY | Facility: CLINIC | Age: 58
End: 2023-03-09
Payer: COMMERCIAL

## 2023-03-14 ENCOUNTER — TRANSFERRED RECORDS (OUTPATIENT)
Dept: MULTI SPECIALTY CLINIC | Facility: CLINIC | Age: 58
End: 2023-03-14

## 2023-03-14 LAB
CREATININE (EXTERNAL): 1.02 MG/DL (ref 0.73–1.18)
GFR ESTIMATED (EXTERNAL): >60 ML/MIN/1.73M2
GLUCOSE (EXTERNAL): 89 MG/DL (ref 70–100)
POTASSIUM (EXTERNAL): 4.5 MMOL/L (ref 3.5–5.1)

## 2023-03-20 ENCOUNTER — ANESTHESIA EVENT (OUTPATIENT)
Dept: SURGERY | Facility: CLINIC | Age: 58
End: 2023-03-20
Payer: COMMERCIAL

## 2023-03-20 NOTE — ANESTHESIA PREPROCEDURE EVALUATION
"Anesthesia Pre-Procedure Evaluation    Patient: Alphonso Hicks   MRN: [id number] : 1965        Procedure : Procedure(s):  BRONCHOSCOPY, WITH BIOPSY, ROBOT ASSISTED  ION          Past Medical History:   Diagnosis Date     Dermatitis      Sarcoma (H)     right upper extremity      Past Surgical History:   Procedure Laterality Date     APPENDECTOMY       ARTHROSCOPIC RECONSTRUCTION ANTERIOR CRUCIATE LIGAMENT Right      ELBOW SURGERY       EXCISE SOFT TISSUE TUMOR ELBOW Right 2016    Procedure: EXCISE SOFT TISSUE TUMOR ELBOW;  Surgeon: Raleigh Alvarado MD;  Location: [facility]     KNEE SURGERY        No Known Allergies   Social History     Tobacco Use     Smoking status: Never     Smokeless tobacco: Never   Substance Use Topics     Alcohol use: Yes     Alcohol/week: 0.0 standard drinks     Comment: occ, 3 -4 per week      Wt Readings from Last 1 Encounters:   23 81.2 kg (179 lb)        Anesthesia Evaluation   Pt has had prior anesthetic.     No history of anesthetic complications       ROS/MED HX  ENT/Pulmonary: Comment: Pulmonary Nodules      Neurologic: Comment: C-spine MRI  for neck pain:  \"1.  Multilevel degenerative changes throughout the cervical spine as detailed above.  2.  Marked edema around the degenerated right C3-C4 facet joint. This could be a source of pain.  3.  At C3-C4, there is mild spinal canal narrowing as well as severe right and moderate left neural foraminal narrowing.  4.  At C4-C5, there is moderate spinal canal narrowing as well as severe right and moderate to severe left neural foraminal narrowing.  5.  At C5-C6, there is moderate spinal canal narrowing as well as severe bilateral neural foraminal narrowing.  6.  At C6-C7, there is mild spinal canal narrowing as well as moderate-to-severe right and severe left neural foraminal narrowing.  7.  At C7-T1, there is severe right and moderate-to-severe left neural foraminal narrowing.  8.  No focal destructive bony lesions " "appreciated. No abnormal signal or enhancement along the cervical spinal cord.\"      Cardiovascular:  - neg cardiovascular ROS     METS/Exercise Tolerance: >4 METS    Hematologic:  - neg hematologic  ROS     Musculoskeletal:  - neg musculoskeletal ROS     GI/Hepatic:  - neg GI/hepatic ROS     Renal/Genitourinary:  - neg Renal ROS     Endo:  - neg endo ROS     Psychiatric/Substance Use:  - neg psychiatric ROS     Infectious Disease:  - neg infectious disease ROS     Malignancy:   (+) Malignancy, History of Other.Other CA Myxofibrosarcoma right elbow status post Surgery.    Other:               OUTSIDE LABS:  CBC: No results found for: WBC, HGB, HCT, PLT  BMP: No results found for: NA, POTASSIUM, CHLORIDE, CO2, BUN, CR, GLC  COAGS: No results found for: PTT, INR, FIBR  POC: No results found for: BGM, HCG, HCGS  HEPATIC: No results found for: ALBUMIN, PROTTOTAL, ALT, AST, GGT, ALKPHOS, BILITOTAL, BILIDIRECT, LIA  OTHER: No results found for: PH, LACT, A1C, PAUL, PHOS, MAG, LIPASE, AMYLASE, TSH, T4, T3, CRP, SED    Anesthesia Plan    ASA Status:  2   NPO Status:  NPO Appropriate    Anesthesia Type: General.     - Airway: ETT   Induction: Intravenous.   Maintenance: TIVA.        Consents    Anesthesia Plan(s) and associated risks, benefits, and realistic alternatives discussed. Questions answered and patient/representative(s) expressed understanding.    - Discussed:     - Discussed with:  Patient         Postoperative Care    Pain management: IV analgesics.   PONV prophylaxis: Ondansetron (or other 5HT-3), Dexamethasone or Solumedrol, Background Propofol Infusion     Comments:           H&P reviewed: Unable to attach H&P to encounter due to EHR limitations. H&P Update: appropriate H&P reviewed, patient examined. No interval changes since H&P (within 30 days).         Dharmesh Perez MD  "

## 2023-03-21 ENCOUNTER — ANESTHESIA (OUTPATIENT)
Dept: SURGERY | Facility: CLINIC | Age: 58
End: 2023-03-21
Payer: COMMERCIAL

## 2023-03-21 ENCOUNTER — HOSPITAL ENCOUNTER (OUTPATIENT)
Facility: CLINIC | Age: 58
Discharge: HOME | End: 2023-03-21
Attending: STUDENT IN AN ORGANIZED HEALTH CARE EDUCATION/TRAINING PROGRAM | Admitting: STUDENT IN AN ORGANIZED HEALTH CARE EDUCATION/TRAINING PROGRAM
Payer: COMMERCIAL

## 2023-03-21 ENCOUNTER — APPOINTMENT (OUTPATIENT)
Dept: GENERAL RADIOLOGY | Facility: CLINIC | Age: 58
End: 2023-03-21
Attending: STUDENT IN AN ORGANIZED HEALTH CARE EDUCATION/TRAINING PROGRAM
Payer: COMMERCIAL

## 2023-03-21 ENCOUNTER — HOSPITAL ENCOUNTER (OUTPATIENT)
Dept: CT IMAGING | Facility: CLINIC | Age: 58
Discharge: HOME | End: 2023-03-21
Attending: STUDENT IN AN ORGANIZED HEALTH CARE EDUCATION/TRAINING PROGRAM | Admitting: STUDENT IN AN ORGANIZED HEALTH CARE EDUCATION/TRAINING PROGRAM
Payer: COMMERCIAL

## 2023-03-21 VITALS
BODY MASS INDEX: 27.03 KG/M2 | HEART RATE: 54 BPM | SYSTOLIC BLOOD PRESSURE: 118 MMHG | DIASTOLIC BLOOD PRESSURE: 74 MMHG | WEIGHT: 178.35 LBS | RESPIRATION RATE: 16 BRPM | HEIGHT: 68 IN | TEMPERATURE: 97.7 F | OXYGEN SATURATION: 95 %

## 2023-03-21 DIAGNOSIS — R91.8 LUNG NODULES: ICD-10-CM

## 2023-03-21 DIAGNOSIS — R91.8 PULMONARY NODULES: ICD-10-CM

## 2023-03-21 PROCEDURE — 999N000141 HC STATISTIC PRE-PROCEDURE NURSING ASSESSMENT: Performed by: STUDENT IN AN ORGANIZED HEALTH CARE EDUCATION/TRAINING PROGRAM

## 2023-03-21 PROCEDURE — 71045 X-RAY EXAM CHEST 1 VIEW: CPT | Mod: 26 | Performed by: RADIOLOGY

## 2023-03-21 PROCEDURE — 272N000002 HC OR SUPPLY OTHER OPNP: Performed by: STUDENT IN AN ORGANIZED HEALTH CARE EDUCATION/TRAINING PROGRAM

## 2023-03-21 PROCEDURE — 88173 CYTOPATH EVAL FNA REPORT: CPT | Mod: 26 | Performed by: PATHOLOGY

## 2023-03-21 PROCEDURE — 31629 BRONCHOSCOPY/NEEDLE BX EACH: CPT | Performed by: STUDENT IN AN ORGANIZED HEALTH CARE EDUCATION/TRAINING PROGRAM

## 2023-03-21 PROCEDURE — 258N000003 HC RX IP 258 OP 636: Performed by: NURSE ANESTHETIST, CERTIFIED REGISTERED

## 2023-03-21 PROCEDURE — 31653 BRONCH EBUS SAMPLNG 3/> NODE: CPT | Performed by: STUDENT IN AN ORGANIZED HEALTH CARE EDUCATION/TRAINING PROGRAM

## 2023-03-21 PROCEDURE — 88305 TISSUE EXAM BY PATHOLOGIST: CPT | Mod: 26 | Performed by: PATHOLOGY

## 2023-03-21 PROCEDURE — 250N000009 HC RX 250: Performed by: NURSE ANESTHETIST, CERTIFIED REGISTERED

## 2023-03-21 PROCEDURE — 250N000011 HC RX IP 250 OP 636: Performed by: NURSE ANESTHETIST, CERTIFIED REGISTERED

## 2023-03-21 PROCEDURE — 250N000025 HC SEVOFLURANE, PER MIN: Performed by: STUDENT IN AN ORGANIZED HEALTH CARE EDUCATION/TRAINING PROGRAM

## 2023-03-21 PROCEDURE — 370N000017 HC ANESTHESIA TECHNICAL FEE, PER MIN: Performed by: STUDENT IN AN ORGANIZED HEALTH CARE EDUCATION/TRAINING PROGRAM

## 2023-03-21 PROCEDURE — 360N000084 HC SURGERY LEVEL 4 W/ FLUORO, PER MIN: Performed by: STUDENT IN AN ORGANIZED HEALTH CARE EDUCATION/TRAINING PROGRAM

## 2023-03-21 PROCEDURE — 272N000001 HC OR GENERAL SUPPLY STERILE: Performed by: STUDENT IN AN ORGANIZED HEALTH CARE EDUCATION/TRAINING PROGRAM

## 2023-03-21 PROCEDURE — 710N000010 HC RECOVERY PHASE 1, LEVEL 2, PER MIN: Performed by: STUDENT IN AN ORGANIZED HEALTH CARE EDUCATION/TRAINING PROGRAM

## 2023-03-21 PROCEDURE — 71250 CT THORAX DX C-: CPT | Mod: 26 | Performed by: RADIOLOGY

## 2023-03-21 PROCEDURE — 999N000065 XR CHEST PORT 1 VIEW

## 2023-03-21 PROCEDURE — 999N000181 XR SURGERY CARM FLUORO GREATER THAN 5 MIN W STILLS

## 2023-03-21 PROCEDURE — 710N000012 HC RECOVERY PHASE 2, PER MINUTE: Performed by: STUDENT IN AN ORGANIZED HEALTH CARE EDUCATION/TRAINING PROGRAM

## 2023-03-21 PROCEDURE — 71250 CT THORAX DX C-: CPT

## 2023-03-21 PROCEDURE — 31654 BRONCH EBUS IVNTJ PERPH LES: CPT | Performed by: STUDENT IN AN ORGANIZED HEALTH CARE EDUCATION/TRAINING PROGRAM

## 2023-03-21 PROCEDURE — 31627 NAVIGATIONAL BRONCHOSCOPY: CPT | Performed by: STUDENT IN AN ORGANIZED HEALTH CARE EDUCATION/TRAINING PROGRAM

## 2023-03-21 PROCEDURE — 88173 CYTOPATH EVAL FNA REPORT: CPT | Mod: TC | Performed by: STUDENT IN AN ORGANIZED HEALTH CARE EDUCATION/TRAINING PROGRAM

## 2023-03-21 RX ORDER — HYDRALAZINE HYDROCHLORIDE 20 MG/ML
2.5-5 INJECTION INTRAMUSCULAR; INTRAVENOUS EVERY 10 MIN PRN
Status: DISCONTINUED | OUTPATIENT
Start: 2023-03-21 | End: 2023-03-21 | Stop reason: HOSPADM

## 2023-03-21 RX ORDER — FENTANYL CITRATE 50 UG/ML
INJECTION, SOLUTION INTRAMUSCULAR; INTRAVENOUS PRN
Status: DISCONTINUED | OUTPATIENT
Start: 2023-03-21 | End: 2023-03-21

## 2023-03-21 RX ORDER — FENTANYL CITRATE 50 UG/ML
25 INJECTION, SOLUTION INTRAMUSCULAR; INTRAVENOUS EVERY 5 MIN PRN
Status: DISCONTINUED | OUTPATIENT
Start: 2023-03-21 | End: 2023-03-21 | Stop reason: HOSPADM

## 2023-03-21 RX ORDER — OXYCODONE HYDROCHLORIDE 5 MG/1
5 TABLET ORAL EVERY 4 HOURS PRN
Status: DISCONTINUED | OUTPATIENT
Start: 2023-03-21 | End: 2023-03-21 | Stop reason: HOSPADM

## 2023-03-21 RX ORDER — ONDANSETRON 2 MG/ML
INJECTION INTRAMUSCULAR; INTRAVENOUS PRN
Status: DISCONTINUED | OUTPATIENT
Start: 2023-03-21 | End: 2023-03-21

## 2023-03-21 RX ORDER — FENTANYL CITRATE 50 UG/ML
50 INJECTION, SOLUTION INTRAMUSCULAR; INTRAVENOUS EVERY 5 MIN PRN
Status: DISCONTINUED | OUTPATIENT
Start: 2023-03-21 | End: 2023-03-21 | Stop reason: HOSPADM

## 2023-03-21 RX ORDER — LIDOCAINE HYDROCHLORIDE 20 MG/ML
INJECTION, SOLUTION INFILTRATION; PERINEURAL PRN
Status: DISCONTINUED | OUTPATIENT
Start: 2023-03-21 | End: 2023-03-21

## 2023-03-21 RX ORDER — HALOPERIDOL 5 MG/ML
1 INJECTION INTRAMUSCULAR
Status: DISCONTINUED | OUTPATIENT
Start: 2023-03-21 | End: 2023-03-21 | Stop reason: HOSPADM

## 2023-03-21 RX ORDER — ONDANSETRON 4 MG/1
4 TABLET, ORALLY DISINTEGRATING ORAL EVERY 30 MIN PRN
Status: DISCONTINUED | OUTPATIENT
Start: 2023-03-21 | End: 2023-03-21 | Stop reason: HOSPADM

## 2023-03-21 RX ORDER — ACETAMINOPHEN 325 MG/1
975 TABLET ORAL EVERY 6 HOURS PRN
Status: DISCONTINUED | OUTPATIENT
Start: 2023-03-21 | End: 2023-03-21 | Stop reason: HOSPADM

## 2023-03-21 RX ORDER — LABETALOL HYDROCHLORIDE 5 MG/ML
10 INJECTION, SOLUTION INTRAVENOUS
Status: DISCONTINUED | OUTPATIENT
Start: 2023-03-21 | End: 2023-03-21 | Stop reason: HOSPADM

## 2023-03-21 RX ORDER — HYDROMORPHONE HCL IN WATER/PF 6 MG/30 ML
0.4 PATIENT CONTROLLED ANALGESIA SYRINGE INTRAVENOUS EVERY 5 MIN PRN
Status: DISCONTINUED | OUTPATIENT
Start: 2023-03-21 | End: 2023-03-21 | Stop reason: HOSPADM

## 2023-03-21 RX ORDER — PROPOFOL 10 MG/ML
INJECTION, EMULSION INTRAVENOUS PRN
Status: DISCONTINUED | OUTPATIENT
Start: 2023-03-21 | End: 2023-03-21

## 2023-03-21 RX ORDER — PROPOFOL 10 MG/ML
INJECTION, EMULSION INTRAVENOUS CONTINUOUS PRN
Status: DISCONTINUED | OUTPATIENT
Start: 2023-03-21 | End: 2023-03-21

## 2023-03-21 RX ORDER — SODIUM CHLORIDE, SODIUM LACTATE, POTASSIUM CHLORIDE, CALCIUM CHLORIDE 600; 310; 30; 20 MG/100ML; MG/100ML; MG/100ML; MG/100ML
INJECTION, SOLUTION INTRAVENOUS CONTINUOUS PRN
Status: DISCONTINUED | OUTPATIENT
Start: 2023-03-21 | End: 2023-03-21

## 2023-03-21 RX ORDER — HYDROMORPHONE HCL IN WATER/PF 6 MG/30 ML
0.2 PATIENT CONTROLLED ANALGESIA SYRINGE INTRAVENOUS EVERY 5 MIN PRN
Status: DISCONTINUED | OUTPATIENT
Start: 2023-03-21 | End: 2023-03-21 | Stop reason: HOSPADM

## 2023-03-21 RX ORDER — ALBUTEROL SULFATE 0.83 MG/ML
2.5 SOLUTION RESPIRATORY (INHALATION) EVERY 4 HOURS PRN
Status: DISCONTINUED | OUTPATIENT
Start: 2023-03-21 | End: 2023-03-21 | Stop reason: HOSPADM

## 2023-03-21 RX ORDER — DEXAMETHASONE SODIUM PHOSPHATE 4 MG/ML
INJECTION, SOLUTION INTRA-ARTICULAR; INTRALESIONAL; INTRAMUSCULAR; INTRAVENOUS; SOFT TISSUE PRN
Status: DISCONTINUED | OUTPATIENT
Start: 2023-03-21 | End: 2023-03-21

## 2023-03-21 RX ORDER — ONDANSETRON 2 MG/ML
4 INJECTION INTRAMUSCULAR; INTRAVENOUS EVERY 30 MIN PRN
Status: DISCONTINUED | OUTPATIENT
Start: 2023-03-21 | End: 2023-03-21 | Stop reason: HOSPADM

## 2023-03-21 RX ORDER — SODIUM CHLORIDE, SODIUM LACTATE, POTASSIUM CHLORIDE, CALCIUM CHLORIDE 600; 310; 30; 20 MG/100ML; MG/100ML; MG/100ML; MG/100ML
INJECTION, SOLUTION INTRAVENOUS CONTINUOUS
Status: DISCONTINUED | OUTPATIENT
Start: 2023-03-21 | End: 2023-03-21 | Stop reason: HOSPADM

## 2023-03-21 RX ADMIN — FENTANYL CITRATE 50 MCG: 50 INJECTION, SOLUTION INTRAMUSCULAR; INTRAVENOUS at 10:14

## 2023-03-21 RX ADMIN — Medication 50 MG: at 10:01

## 2023-03-21 RX ADMIN — Medication 10 MG: at 10:59

## 2023-03-21 RX ADMIN — PROPOFOL 150 MG: 10 INJECTION, EMULSION INTRAVENOUS at 10:00

## 2023-03-21 RX ADMIN — DEXAMETHASONE SODIUM PHOSPHATE 8 MG: 4 INJECTION, SOLUTION INTRA-ARTICULAR; INTRALESIONAL; INTRAMUSCULAR; INTRAVENOUS; SOFT TISSUE at 10:41

## 2023-03-21 RX ADMIN — PROPOFOL 50 MG: 10 INJECTION, EMULSION INTRAVENOUS at 10:02

## 2023-03-21 RX ADMIN — Medication 20 MG: at 11:04

## 2023-03-21 RX ADMIN — MIDAZOLAM 2 MG: 1 INJECTION INTRAMUSCULAR; INTRAVENOUS at 10:23

## 2023-03-21 RX ADMIN — PROPOFOL 150 MCG/KG/MIN: 10 INJECTION, EMULSION INTRAVENOUS at 10:17

## 2023-03-21 RX ADMIN — SODIUM CHLORIDE, POTASSIUM CHLORIDE, SODIUM LACTATE AND CALCIUM CHLORIDE: 600; 310; 30; 20 INJECTION, SOLUTION INTRAVENOUS at 09:50

## 2023-03-21 RX ADMIN — SUGAMMADEX 200 MG: 100 INJECTION, SOLUTION INTRAVENOUS at 11:51

## 2023-03-21 RX ADMIN — SODIUM CHLORIDE, POTASSIUM CHLORIDE, SODIUM LACTATE AND CALCIUM CHLORIDE: 600; 310; 30; 20 INJECTION, SOLUTION INTRAVENOUS at 11:39

## 2023-03-21 RX ADMIN — LIDOCAINE HYDROCHLORIDE 100 MG: 20 INJECTION, SOLUTION INFILTRATION; PERINEURAL at 10:00

## 2023-03-21 RX ADMIN — MIDAZOLAM 2 MG: 1 INJECTION INTRAMUSCULAR; INTRAVENOUS at 10:19

## 2023-03-21 RX ADMIN — FENTANYL CITRATE 50 MCG: 50 INJECTION, SOLUTION INTRAMUSCULAR; INTRAVENOUS at 10:00

## 2023-03-21 RX ADMIN — ONDANSETRON 4 MG: 2 INJECTION INTRAMUSCULAR; INTRAVENOUS at 12:01

## 2023-03-21 ASSESSMENT — ACTIVITIES OF DAILY LIVING (ADL)
ADLS_ACUITY_SCORE: 35

## 2023-03-21 NOTE — ANESTHESIA POSTPROCEDURE EVALUATION
"Patient: Alphonso Hicks    Procedure: Procedure(s):  BRONCHOSCOPY, WITH BIOPSY, ROBOT ASSISTED  ION  Endobronchial ultrasound flexible with transbronchial biopsy       Anesthesia Type:  General    Note:  Disposition: Outpatient   Postop Pain Control: Uneventful            Sign Out: Well controlled pain   PONV: No   Neuro/Psych: Uneventful            Sign Out: Acceptable/Baseline neuro status   Airway/Respiratory: Uneventful            Sign Out: Acceptable/Baseline resp. status   CV/Hemodynamics: Uneventful            Sign Out: Acceptable CV status; No obvious hypovolemia; No obvious fluid overload   Other NRE: NONE   DID A NON-ROUTINE EVENT OCCUR? YES    Event details/Postop Comments:  TIVA anesthetic performed due to bronchoscopy. Uneventful induction, followed by inadvertent delay in starting propofol infusion. Elevated BP and HR noted, immediately started propofol infusion, gave midazolam 4 mg, sevoflurane added briefly. Patient extubated after procedure, brought to PACU.    I evaluated the patient in PACU when he was awake. He remembers going to sleep in the OR, waking up in the PACU, and nothing in between. No \"dreams\" or recall under anesthesia. Discussed anesthetic with patient. He has no questions or concerns.            Last vitals:  Vitals Value Taken Time   /84 03/21/23 1245   Temp 35.9  C (96.7  F) 03/21/23 1253   Pulse 52 03/21/23 1257   Resp 16 03/21/23 1230   SpO2 95 % 03/21/23 1257   Vitals shown include unvalidated device data.    Electronically Signed By: Ting Barron MD  March 21, 2023  12:58 PM  "

## 2023-03-21 NOTE — DISCHARGE INSTRUCTIONS
Post-Procedure Patient Instructions:    March 21, 2023  Alphonso Grahamt    Your procedure bronchoscopy with biopsy, endobronchial ultrasound with transbronchial needle aspiration was completed without any immediate complications.    You may cough up scant amount of blood for the next 12-24 hours. If you have excessive cough with blood, chest pain, shortness of breath or other concerning symptoms, please report to the closest emergency room.    You may experience low grade (less than 100.5 F) fever next 24 hours for which you can take Tylenol. If the fever persists more than 24 hours contact our office or your primary care provider.    You can resume your regular diet as it was prior to procedure.    You may resume your medications after the procedure unless you are instructed to do differently.     Should you have any question, please do not hesitate to call our office. [phone]    Our office (Thoracic/Pulmonary--[phone]) will call you with the results of any samples taken during the procedure.     Please note that you may get a result notification through  My Chart  before us calling you as the Laboratories are instructed to release the results as soon as they are available to the patients and providers at the same time. Please allow your provider 24 hours call you to discuss the results.      Wilber Park   Interventional pulmonary fellow]

## 2023-03-21 NOTE — ANESTHESIA PROCEDURE NOTES
Airway       Patient location during procedure: OR       Procedure Start/Stop Times: 3/21/2023 10:03 AM  Staff -        CRNA: Estela Duran APRN CRNA       Performed By: CRNA  Consent for Airway        Urgency: elective  Indications and Patient Condition       Indications for airway management: deidre-procedural       Induction type:intravenous       Mask difficulty assessment: 2 - vent by mask + OA or adjuvant +/- NMBA    Final Airway Details       Final airway type: endotracheal airway       Successful airway: ETT - single  Endotracheal Airway Details        ETT size (mm): 8.5       Cuffed: yes       Successful intubation technique: video laryngoscopy       VL Blade Size: MAC 3       Grade View of Cords: 1       Adjucts: stylet       Position: Right       Measured from: lips       Bite block used: None    Post intubation assessment        Placement verified by: capnometry, equal breath sounds and chest rise        Number of attempts at approach: 1       Secured with: pink tape and cloth tape       Ease of procedure: easy       Dentition: Intact and Unchanged    Medication(s) Administered   Medication Administration Time: 3/21/2023 10:03 AM

## 2023-03-21 NOTE — PROCEDURES
INTERVENTIONAL PULMONOLOGY       Procedure(s):    A flexible bronchoscopy  Airway exam  EBUS-TBNA (3 sites)  Fluoroscopic guidance   Radial EBUS Navigation (2 sites)  Therapeutic suctioning (5 sites)  Transbronchial fine needle aspiration (2 sites)  Transbronchial forcep biopsies (1 sites)  ION Robotic Bronchoscopy     Indication:  Bilateral lung nodules    Attending of Record:  Estela Allan MD     Interventional Pulmonary Fellow   Wilber Park    Trainees Present:   None     Medications:    General Anesthesia - See anesthesia flowsheet for details    Sedation Time:   Per Anesthesia Care Provider    Time Out:  Performed    The patient's medical record has been reviewed.  The indication for the procedure was reviewed.  The necessary history and physical examination was performed and reviewed.  The risks, benefits and alternatives of the procedure were discussed with the the patient in detail and she had the opportunity to ask questions.  I discussed in particular the potential complications including risks of minor or life-threatening bleeding and/or infection, respiratory failure, vocal cord trauma / paralysis, pneumothorax, and discomfort. Sedation risks were also discussed including abnormal heart rhythms, low blood pressure, and respiratory failure. All questions were answered to the best of my ability.  Verbal and written informed consent was obtained.  The proposed procedure and the patient's identification were verified prior to the procedure by the physician and the nurse.    The patient was assessed for the adequacy for the procedure and to receive medications.   Mental Status:  Alert and oriented x 3  Airway examination:  Class I (Complete visualization of soft palate)  Pulmonary:  Non labored respirations  CV:  Regular rate  ASA Grade:  (II)  Mild systemic disease    After clinical evaluation and reviewing the indication, risks, alternatives and benefits of the procedure the patient was deemed to be  in satisfactory condition to undergo the procedure.           A Tuberculosis risk assessment was performed:  The patient has no known RISK of Tuberculosis    The procedure was performed in a negative airflow room: The patient could not be moved to a negative airflow room because of needed OR for the procedure    Maneuvers / Procedure:      Airway Examination: A complete airway examination was performed from the distal trachea to the subsegmental level in each lobe of both lungs.  Pertinent findings include normal appearing airways bilaterally.    ION Robotic Bronchoscopy: Planning was performed on the laptop prior to the procedure. The ION successfully completed its pre-procedural check phase. The ION arm was oriented towards the ETT and docked successfully. The robotic catheter was inserted into the ETT and the guide was clamped down. Registration was then completed successfully. The catheter was advanced towards the AMPARO and RLL lesions/nodules using the vision probe. Local confirmation of the lesion utilized with Radial EBUS and Fluoroscopy .. The lesion was biopsied using 21G FNA Needle. A total of 6 passes were performed including forcep biopsies in the AMPARO. SHIRLEY was present throughout the procedure. EBL was minimal. Guide catheter and vision probe was removed successfully. The ION platform was undocked without issues.   Therapeutic suctioning: 15-20min of          EBUS-TBNA (LN): The EBUS scope was inserted and evaluation included:   Station 2R; not evaluated .   Station 2L; not evaluated .   Station 4R; visualized and biopsied. A total of 4 biopsies were performed using 21G FNA Needle. .   Station 4L; visualized and not biopsied. Reason: LN diameter was <5mm.   Station 7; visualized and biopsied. A total of 4 biopsies were performed using 21G FNA Needle. .   Station 10R; not evaluated .   Station 10L; not evaluated .   Station 11R; visualized and not biopsied. Reason: LN diameter was <5mm.   Station 11L;  visualized and biopsied. A total of 4 biopsies were performed using 21G FNA Needle. .   Station 12R; not evaluated .   Station 12L; not evaluated .     Justina was Absent  operative time was spent clearing out the airway of debris, blood and mucous prior to the intervention.     Any disposable equipment was visually inspected and deemed to be intact immediately post procedure.      Relevant Pictures  none    Assessment and Recommendations:     1. Successful completion of bronchoscopy with biopsy using ion as well as EBUS TBNA  2. Ok to discharge once medically stable.   3. Follow up FNA results.           Wilber Park  Interventional pulmonary fellow  Pager: [phone]

## 2023-03-21 NOTE — ANESTHESIA CARE TRANSFER NOTE
Patient: Alphonso Hicks    Procedure: Procedure(s):  BRONCHOSCOPY, WITH BIOPSY, ROBOT ASSISTED  ION  Endobronchial ultrasound flexible with transbronchial biopsy       Diagnosis: Pulmonary nodules [R91.8]  Diagnosis Additional Information: No value filed.    Anesthesia Type:   General     Note:    Oropharynx: oral airway in place  Level of Consciousness: drowsy  Oxygen Supplementation: face mask      Dentition: dentition unchanged  Vital Signs Stable: post-procedure vital signs reviewed and stable  Report to RN Given: handoff report given  Patient transferred to: PACU    Handoff Report: Identifed the Patient, Identified the Reponsible Provider, Reviewed the pertinent medical history, Discussed the surgical course, Reviewed Intra-OP anesthesia mangement and issues during anesthesia, Set expectations for post-procedure period and Allowed opportunity for questions and acknowledgement of understanding      Vitals:  Vitals Value Taken Time   /88 03/21/23 1230   Temp 35.9  C (96.6  F) 03/21/23 1223   Pulse 53 03/21/23 1232   Resp 16 03/21/23 1223   SpO2 100 % 03/21/23 1232   Vitals shown include unvalidated device data.    Electronically Signed By: YAMILETH Mcnamara CRNA  March 21, 2023  12:33 PM

## 2023-03-22 LAB
PATH REPORT.COMMENTS IMP SPEC: NORMAL
PATH REPORT.COMMENTS IMP SPEC: NORMAL
PATH REPORT.FINAL DX SPEC: NORMAL
PATH REPORT.GROSS SPEC: NORMAL
PATH REPORT.MICROSCOPIC SPEC OTHER STN: NORMAL
PATH REPORT.RELEVANT HX SPEC: NORMAL

## 2023-03-29 ENCOUNTER — TELEPHONE (OUTPATIENT)
Dept: PULMONOLOGY | Facility: CLINIC | Age: 58
End: 2023-03-29
Payer: COMMERCIAL

## 2023-03-29 NOTE — TELEPHONE ENCOUNTER
Patient called procedure scheduling line to ask about getting in touch with Dr. Allan. He said that he saw some results come in on MyChart after his procedure on 03/21, but thought she was going to call him to go over them. Message sent to Dr. Allan for follow up.    Byron Maxwell on 3/29/2023 at 9:20 AM

## 2023-04-04 ENCOUNTER — MYC MEDICAL ADVICE (OUTPATIENT)
Dept: ORTHOPEDICS | Facility: CLINIC | Age: 58
End: 2023-04-04
Payer: COMMERCIAL

## 2023-04-05 ENCOUNTER — TELEPHONE (OUTPATIENT)
Dept: ORTHOPEDICS | Facility: CLINIC | Age: 58
End: 2023-04-05
Payer: COMMERCIAL

## 2023-04-05 NOTE — TELEPHONE ENCOUNTER
[facility] Call Center    Phone Message    May a detailed message be left on voicemail: yes     Reason for Call: Other: Brynn nurse care coordinator for thoracic surgery [phone] called to discuss biopsy results and next steps for treatment. Please call to discuss     Action Taken: Other: [facility]    Travel Screening: Not Applicable

## 2023-04-13 DIAGNOSIS — C49.11 SOFT TISSUE SARCOMA OF UPPER EXTREMITY, RIGHT (H): Primary | ICD-10-CM

## 2023-04-13 DIAGNOSIS — R91.8 PULMONARY NODULES: ICD-10-CM

## 2023-04-13 NOTE — CONSULTS
Outpatient IR Biopsy Referral    Patient is a 58 y/o male with a PMH of soft tissue myxofibrosarcoma of upper right extremity s/p radio therapy and surgical resection 2015, resection 2016.  Patient found to have a new posterior neck mass in Dec 2022, new parenchymal lung lesion concerning for recurrence.  ION lung biopsies 3/21/22 with Dr. Allan pulmonology resulting in scant cellularity neg for malignancy.  IR has been asked to biopsy a AMPARO nodule for definitive diagnosis prior to decision for a wedge resection due to risk of it being an extensive surgery.         CT 3/21/23    Lungs: Stable cluster of nodules and tubular opacities in the left  upper lobe. The largest nodule measures 1.5 x 1.0 cm with multiple  adjacent micronodules (series 4, image 162). There is a similar  cluster of peribronchovascular nodule/tubular opacities in the right  lower lobe, the largest measuring approximately 1.6 x 0.9 cm, stable  compared to prior with adjacent micronodules (series 4, image 332).  Persistent right hemidiaphragm elevation with adjacent bandlike  atelectasis. Additional scattered 2 mm solid pulmonary nodules are  stable, for example in the left lower lobe (series 4, image 413). Mild  left basilar atelectasis. No pleural effusion or pneumothorax.    IMPRESSION:   Stable clustered nodules in the left upper and right lower lobes  dating back to 1/12/2023. Additional 2 mm solid scattered pulmonary  nodules are stable.     Case and imaging CT 3/21/23 was reviewed with Dr. Cage from IR and CT guided biopsy of the AMPARO lung lesion is approved. Series 4 Image 162.    Unable to review medications in epic or care everywhere.     Procedure order, surgical pathology and leukemia lymphoma orders placed.    If requesting team would like samples sent for anything else please enter them prior to scheduled procedure.    Primary team Dr. Payne made aware of IR recommendations via epic messaging.    IR PA provider telephone Clinic  visit will be coordinated to discuss biopsy procedure, risks and benefits.     Shelby Villegas DNP, APRN  Interventional Radiology   IR on-call pager: [phone]

## 2023-04-14 ENCOUNTER — MYC MEDICAL ADVICE (OUTPATIENT)
Dept: INTERVENTIONAL RADIOLOGY/VASCULAR | Facility: CLINIC | Age: 58
End: 2023-04-14
Payer: COMMERCIAL

## 2023-04-20 ENCOUNTER — HOSPITAL ENCOUNTER (OUTPATIENT)
Facility: CLINIC | Age: 58
Discharge: HOME | End: 2023-04-20
Attending: STUDENT IN AN ORGANIZED HEALTH CARE EDUCATION/TRAINING PROGRAM | Admitting: RADIOLOGY
Payer: COMMERCIAL

## 2023-04-20 ENCOUNTER — APPOINTMENT (OUTPATIENT)
Dept: GENERAL RADIOLOGY | Facility: CLINIC | Age: 58
End: 2023-04-20
Attending: RADIOLOGY
Payer: COMMERCIAL

## 2023-04-20 ENCOUNTER — APPOINTMENT (OUTPATIENT)
Dept: INTERVENTIONAL RADIOLOGY/VASCULAR | Facility: CLINIC | Age: 58
End: 2023-04-20
Attending: STUDENT IN AN ORGANIZED HEALTH CARE EDUCATION/TRAINING PROGRAM
Payer: COMMERCIAL

## 2023-04-20 ENCOUNTER — APPOINTMENT (OUTPATIENT)
Dept: MEDSURG UNIT | Facility: CLINIC | Age: 58
End: 2023-04-20
Attending: STUDENT IN AN ORGANIZED HEALTH CARE EDUCATION/TRAINING PROGRAM
Payer: COMMERCIAL

## 2023-04-20 VITALS
TEMPERATURE: 97.8 F | DIASTOLIC BLOOD PRESSURE: 80 MMHG | OXYGEN SATURATION: 98 % | HEIGHT: 69 IN | WEIGHT: 173.6 LBS | SYSTOLIC BLOOD PRESSURE: 119 MMHG | BODY MASS INDEX: 25.71 KG/M2 | HEART RATE: 52 BPM | RESPIRATION RATE: 16 BRPM

## 2023-04-20 DIAGNOSIS — R91.8 PULMONARY NODULES: ICD-10-CM

## 2023-04-20 DIAGNOSIS — C49.11 SOFT TISSUE SARCOMA OF UPPER EXTREMITY, RIGHT (H): ICD-10-CM

## 2023-04-20 LAB
ERYTHROCYTE [DISTWIDTH] IN BLOOD BY AUTOMATED COUNT: 13.2 % (ref 10–15)
HCT VFR BLD AUTO: 46.4 % (ref 40–53)
HGB BLD-MCNC: 15.2 G/DL (ref 13.3–17.7)
INR PPP: 1.03 (ref 0.85–1.15)
MCH RBC QN AUTO: 31 PG (ref 26.5–33)
MCHC RBC AUTO-ENTMCNC: 32.8 G/DL (ref 31.5–36.5)
MCV RBC AUTO: 95 FL (ref 78–100)
PLATELET # BLD AUTO: 236 10E3/UL (ref 150–450)
RBC # BLD AUTO: 4.91 10E6/UL (ref 4.4–5.9)
WBC # BLD AUTO: 4.7 10E3/UL (ref 4–11)

## 2023-04-20 PROCEDURE — 32408 CORE NDL BX LNG/MED PERQ: CPT

## 2023-04-20 PROCEDURE — 258N000003 HC RX IP 258 OP 636: Performed by: NURSE PRACTITIONER

## 2023-04-20 PROCEDURE — 71045 X-RAY EXAM CHEST 1 VIEW: CPT | Mod: 26 | Performed by: RADIOLOGY

## 2023-04-20 PROCEDURE — 88312 SPECIAL STAINS GROUP 1: CPT | Mod: 26 | Performed by: STUDENT IN AN ORGANIZED HEALTH CARE EDUCATION/TRAINING PROGRAM

## 2023-04-20 PROCEDURE — 99152 MOD SED SAME PHYS/QHP 5/>YRS: CPT | Performed by: RADIOLOGY

## 2023-04-20 PROCEDURE — 88184 FLOWCYTOMETRY/ TC 1 MARKER: CPT | Performed by: STUDENT IN AN ORGANIZED HEALTH CARE EDUCATION/TRAINING PROGRAM

## 2023-04-20 PROCEDURE — 85014 HEMATOCRIT: CPT | Performed by: NURSE PRACTITIONER

## 2023-04-20 PROCEDURE — 250N000011 HC RX IP 250 OP 636

## 2023-04-20 PROCEDURE — 272N000506 HC NEEDLE CR6

## 2023-04-20 PROCEDURE — 36415 COLL VENOUS BLD VENIPUNCTURE: CPT | Performed by: NURSE PRACTITIONER

## 2023-04-20 PROCEDURE — 88161 CYTOPATH SMEAR OTHER SOURCE: CPT | Mod: TC | Performed by: STUDENT IN AN ORGANIZED HEALTH CARE EDUCATION/TRAINING PROGRAM

## 2023-04-20 PROCEDURE — 88305 TISSUE EXAM BY PATHOLOGIST: CPT | Mod: 26 | Performed by: STUDENT IN AN ORGANIZED HEALTH CARE EDUCATION/TRAINING PROGRAM

## 2023-04-20 PROCEDURE — 32408 CORE NDL BX LNG/MED PERQ: CPT | Mod: LT | Performed by: RADIOLOGY

## 2023-04-20 PROCEDURE — 88305 TISSUE EXAM BY PATHOLOGIST: CPT | Mod: TC | Performed by: STUDENT IN AN ORGANIZED HEALTH CARE EDUCATION/TRAINING PROGRAM

## 2023-04-20 PROCEDURE — 85610 PROTHROMBIN TIME: CPT | Performed by: NURSE PRACTITIONER

## 2023-04-20 PROCEDURE — 999N000142 HC STATISTIC PROCEDURE PREP ONLY

## 2023-04-20 PROCEDURE — 88333 PATH CONSLTJ SURG CYTO XM 1: CPT | Mod: 26 | Performed by: STUDENT IN AN ORGANIZED HEALTH CARE EDUCATION/TRAINING PROGRAM

## 2023-04-20 PROCEDURE — 88189 FLOWCYTOMETRY/READ 16 & >: CPT | Performed by: STUDENT IN AN ORGANIZED HEALTH CARE EDUCATION/TRAINING PROGRAM

## 2023-04-20 PROCEDURE — 999N000065 XR CHEST 1 VIEW

## 2023-04-20 PROCEDURE — 71045 X-RAY EXAM CHEST 1 VIEW: CPT

## 2023-04-20 PROCEDURE — 999N000132 HC STATISTIC PP CARE STAGE 1

## 2023-04-20 PROCEDURE — 99152 MOD SED SAME PHYS/QHP 5/>YRS: CPT

## 2023-04-20 PROCEDURE — 250N000009 HC RX 250

## 2023-04-20 RX ORDER — FENTANYL CITRATE 50 UG/ML
25-50 INJECTION, SOLUTION INTRAMUSCULAR; INTRAVENOUS EVERY 5 MIN PRN
Status: DISCONTINUED | OUTPATIENT
Start: 2023-04-20 | End: 2023-04-20 | Stop reason: HOSPADM

## 2023-04-20 RX ORDER — SODIUM CHLORIDE 9 MG/ML
INJECTION, SOLUTION INTRAVENOUS CONTINUOUS
Status: DISCONTINUED | OUTPATIENT
Start: 2023-04-20 | End: 2023-04-20 | Stop reason: HOSPADM

## 2023-04-20 RX ORDER — NALOXONE HYDROCHLORIDE 0.4 MG/ML
0.2 INJECTION, SOLUTION INTRAMUSCULAR; INTRAVENOUS; SUBCUTANEOUS
Status: DISCONTINUED | OUTPATIENT
Start: 2023-04-20 | End: 2023-04-20 | Stop reason: HOSPADM

## 2023-04-20 RX ORDER — LIDOCAINE 40 MG/G
CREAM TOPICAL
Status: DISCONTINUED | OUTPATIENT
Start: 2023-04-20 | End: 2023-04-20 | Stop reason: HOSPADM

## 2023-04-20 RX ORDER — NALOXONE HYDROCHLORIDE 0.4 MG/ML
0.4 INJECTION, SOLUTION INTRAMUSCULAR; INTRAVENOUS; SUBCUTANEOUS
Status: DISCONTINUED | OUTPATIENT
Start: 2023-04-20 | End: 2023-04-20 | Stop reason: HOSPADM

## 2023-04-20 RX ORDER — FLUMAZENIL 0.1 MG/ML
0.2 INJECTION, SOLUTION INTRAVENOUS
Status: DISCONTINUED | OUTPATIENT
Start: 2023-04-20 | End: 2023-04-20 | Stop reason: HOSPADM

## 2023-04-20 RX ADMIN — MIDAZOLAM 0.5 MG: 1 INJECTION INTRAMUSCULAR; INTRAVENOUS at 10:38

## 2023-04-20 RX ADMIN — SODIUM CHLORIDE: 9 INJECTION, SOLUTION INTRAVENOUS at 08:00

## 2023-04-20 RX ADMIN — LIDOCAINE HYDROCHLORIDE 9 ML: 10 INJECTION, SOLUTION EPIDURAL; INFILTRATION; INTRACAUDAL; PERINEURAL at 10:19

## 2023-04-20 RX ADMIN — FENTANYL CITRATE 50 MCG: 50 INJECTION, SOLUTION INTRAMUSCULAR; INTRAVENOUS at 10:14

## 2023-04-20 RX ADMIN — FENTANYL CITRATE 25 MCG: 50 INJECTION, SOLUTION INTRAMUSCULAR; INTRAVENOUS at 10:24

## 2023-04-20 RX ADMIN — MIDAZOLAM 0.5 MG: 1 INJECTION INTRAMUSCULAR; INTRAVENOUS at 10:24

## 2023-04-20 RX ADMIN — FENTANYL CITRATE 25 MCG: 50 INJECTION, SOLUTION INTRAMUSCULAR; INTRAVENOUS at 10:38

## 2023-04-20 RX ADMIN — MIDAZOLAM 1 MG: 1 INJECTION INTRAMUSCULAR; INTRAVENOUS at 10:14

## 2023-04-20 ASSESSMENT — ACTIVITIES OF DAILY LIVING (ADL)
ADLS_ACUITY_SCORE: 35
ADLS_ACUITY_SCORE: 35

## 2023-04-20 NOTE — DISCHARGE INSTRUCTIONS
[facility]    Interventional Radiology  Patient Instructions Following  Lung Biopsy    AFTER YOU GO HOME  If you were given sedation DO NOT drive or operate machinery at home or at work for at least 24 hours  DO relax and take it easy for 48 hours, no strenuous activity for 24 hours  DO drink plenty of fluids  DO resume your regular diet, unless otherwise instructed by your Primary Physician  Keep the dressing dry and in place for 24 hours.  DO NOT SMOKE FOR AT LEAST 24 HOURS, if you have been given any medications that were to help you relax or sedate you during your procedure  DO NOT drink alcoholic beverages the day of your procedure  DO NOT do any strenuous exercise or lifting (> 10 lbs) for at least 7 days following your procedure  DO NOT take a bath or shower for at least 12 hours following your procedure  Remove dressing after shower the next day. Replace with Band aid for 2 days.  Never leave a wet dressing in place.  DO NOT make any important or legal decisions for 24 hours following your procedure  There should be minimum drainage from the biopsy site    CALL THE PHYSICIAN IF:  You start bleeding from the procedure site.  If you do start to bleed from that site, lie down flat and hold pressure on the site for a minimum of 10 minutes.  Your physician will tell you if you need to return to the hospital  You develop nausea or vomiting  You have excessive swelling, redness, or tenderness at the site  You have drainage that looks like it is infected.  You experience severe pain  You develop hives or a rash or unexplained itching  You develop shortness of breath  You develop a temperature of 101 degrees F or greater  You develop chest pain or cough up blood, lightheadedness or fainting        [facility] INTERVENTIONAL RADIOLOGY DEPARTMENT  Procedure Physician: Jared Allan MD                                   Date of procedure: April 20, 2023  Telephone Numbers: [phone]       Monday-Friday 7:30 am to 4:00 pm  [phone] After 4:00 pm Monday-Friday, Weekends & Holidays.   Ask for the Interventional Radiologist on call.  Someone is on call 24 hrs/day  [facility] toll free number: [phone] Monday-Friday 8:00 am to 4:30 pm  [facility] Emergency Dept: [phone]

## 2023-04-20 NOTE — PROGRESS NOTES
Pt is stable and AVSS.  Pt ended an hr bed rest and ambulated.  Pt tolerated activity.  EUN biopsy site drsg CDI no hematoma or bleeding.  Pt c/o a very mild biopsy site discomfort, rated 1/10.  A second CXR post procedure was done.      Pt tolerated diet and activity.  Discharge instructions were reviewed with pt and spouse. Pt verbalized understanding of all discharge instructions.     KAYLEE HICKMAN reviewed  CXR and okay to discharge pt now.

## 2023-04-20 NOTE — PROCEDURES
[facility]    Procedure: IR Procedure Note    Date/Time: 4/20/2023 10:59 AM    Performed by: Jared Allan MD  Authorized by: Jared Allan MD      UNIVERSAL PROTOCOL   Site Marked: NA  Prior Images Obtained and Reviewed:  Yes  Required items: Required blood products, implants, devices and special equipment available    Patient identity confirmed:  Verbally with patient, arm band, provided demographic data and hospital-assigned identification number  Patient was reevaluated immediately before administering moderate or deep sedation or anesthesia  Confirmation Checklist:  Patient's identity using two indicators, relevant allergies, procedure was appropriate and matched the consent or emergent situation and correct equipment/implants were available  Time out: Immediately prior to the procedure a time out was called    Universal Protocol: the Joint Commission Universal Protocol was followed    Preparation: Patient was prepped and draped in usual sterile fashion    ESBL (mL):  2     ANESTHESIA    Anesthesia: Local infiltration  Local Anesthetic:  Lidocaine 1% without epinephrine  Anesthetic Total (mL):  14      SEDATION  Patient Sedated: Yes    Sedation Type:  Moderate (conscious) sedation  Sedation:  Fentanyl and midazolam  Vital signs: Vital signs monitored during sedation    See dictated procedure note for full details.  Findings: 19 gauge coaxial needle placed to the left upper lobe nodule under CT guidance. Nodule was rubbery and resistant to needle passage. 11 passes made and 6 10 gauge cores obtained. 1 submitted in RPMI. 5 to Pathology who reported that the samples did not touch off well. Procedure ended due to number of passes without obtained better tissue and small peribiopsy hemorrhage. No pneumothorax. BioSentry plug deployed as the coaxial needle was removed.    Specimens: none    Complications: None    Condition: Stable      PROCEDURE    Patient  Tolerance:  Patient tolerated the procedure well with no immediate complications  Length of time physician/provider present for 1:1 monitoring during sedation: 30

## 2023-04-20 NOTE — PROGRESS NOTES
Pt received from IR ~1115. S/p Lung biopsy.  Up on arrival to 2A: pt is stable, AVSS and pain free.  Biopsy site drsg / ROCIO chest drsg CDI.

## 2023-04-20 NOTE — PROVIDER NOTIFICATION
Pt roomed on 2A in room 5.    Arrived with spouse, Emelina. Pt is here for lung biopsy.  Pt is stable. AVSS. Pain free.  PIV in and IVF at 75cc/hr. BNP and INR resulted and WDL.  Consent is signed. H & P needs updating.      Emelina will be transporting pt home post procedure.  Her cell # is [phone]

## 2023-04-20 NOTE — IR NOTE
Patient Name: Alphonso Hicks  Medical Record Number: [id number]  Today's Date: 4/20/2023    Procedure: Left upper lobe lung biopsy  Proceduralist: GERSON Allan  Pathology present: yes    Procedure Start: 1014  Procedure end: 1053  Sedation medications administered: lidocaine at site  Fentanyl: 100 mcg  Versed: 2 mg    Report given to:  staff  : n/a    Other Notes: Pt arrived to IR room CT2  from . Consent reviewed. Pt denies any questions or concerns regarding procedure. Pt positioned supine and monitored per protocol. Pt tolerated procedure without any noted complications. Pt transferred back to .    11 passes and 6 core. Path and RPMI samples taken

## 2023-04-20 NOTE — PRE-PROCEDURE
GENERAL PRE-PROCEDURE:   Procedure:  Image guided left upper lobe nodule biopsy; possible chest tube placement  Date/Time:  4/20/2023 8:27 AM    Written consent obtained?: Yes    Risks and benefits: Risks, benefits and alternatives were discussed    Consent given by:  Patient  Patient states understanding of procedure being performed: Yes    Patient's understanding of procedure matches consent: Yes    Procedure consent matches procedure scheduled: Yes    Expected level of sedation:  Moderate  Appropriately NPO:  Yes  ASA Class:  2  Mallampati  :  Grade 1- soft palate, uvula, tonsillar pillars, and posterior pharyngeal wall visible  Lungs:  Lungs clear with good breath sounds bilaterally  Heart:  Normal heart sounds and rate  History & Physical reviewed:  History and physical reviewed and no updates needed  Statement of review:  I have reviewed the lab findings, diagnostic data, medications, and the plan for sedation

## 2023-04-21 LAB
PATH REPORT.COMMENTS IMP SPEC: NORMAL
PATH REPORT.FINAL DX SPEC: NORMAL
PATH REPORT.MICROSCOPIC SPEC OTHER STN: NORMAL
PATH REPORT.RELEVANT HX SPEC: NORMAL

## 2023-04-24 LAB
PATH REPORT.COMMENTS IMP SPEC: NORMAL
PATH REPORT.COMMENTS IMP SPEC: NORMAL
PATH REPORT.FINAL DX SPEC: NORMAL
PATH REPORT.GROSS SPEC: NORMAL
PATH REPORT.MICROSCOPIC SPEC OTHER STN: NORMAL
PATH REPORT.MICROSCOPIC SPEC OTHER STN: NORMAL
PATH REPORT.RELEVANT HX SPEC: NORMAL
PHOTO IMAGE: NORMAL

## 2023-05-01 ENCOUNTER — MYC MEDICAL ADVICE (OUTPATIENT)
Dept: ORTHOPEDICS | Facility: CLINIC | Age: 58
End: 2023-05-01
Payer: COMMERCIAL

## 2023-05-02 ENCOUNTER — TELEPHONE (OUTPATIENT)
Dept: SURGERY | Facility: CLINIC | Age: 58
End: 2023-05-02
Payer: COMMERCIAL

## 2023-05-02 DIAGNOSIS — R91.8 PULMONARY NODULES: Primary | ICD-10-CM

## 2023-05-02 NOTE — TELEPHONE ENCOUNTER
Writer spoke with Rosina, nurse working with Dr. Keller regarding Amaury's bx results:    A. LUNG, LEFT UPPER LOBE, NODULE, BIOPSY:  - Predominantly necrotic tissue with viable tissue consisting of fibrous and chronic inflammation  - Fungal yeast microorganisms, more compatible with Histoplasma spp.  - No evidence of malignancy    Rosina stated she will update Dr. Keller and his regular nurse who is back tomorrow to reach out to patient and schedule an appointment to address the findings. Amaury updated via [email or web address].    Brynn Blanco, RN BSN  Thoracic Surgery RN Care Coordinator  [phone]

## 2023-05-04 NOTE — TELEPHONE ENCOUNTER
Writer received call from nurse Mir at PCP office that Dr. Keller would like pt to be referred to pulmonology to manage AMPARO biopsy findings of fungal yeast microorganisms.     Urgent referral to pulmonology placed. Writer notified patient via [employer].    Brynn Blanco, RN BSN  Thoracic Surgery RN Care Coordinator  [phone]

## 2023-05-09 ENCOUNTER — TELEPHONE (OUTPATIENT)
Dept: PULMONOLOGY | Facility: CLINIC | Age: 58
End: 2023-05-09
Payer: COMMERCIAL

## 2023-05-09 NOTE — TELEPHONE ENCOUNTER
"Left Voicemail (1st Attempt) for the patient to call back and schedule the following:    Appointment type: NEW  Provider: next available general pulm  Return date: 5/9/23  Specialty phone number: [phone]  Additional appointment(s) needed: FPFT  Additonal Notes: 5/9/23 - Indicated as \"Urgent Referral\". LVM and call ctr phone number.  currently has an opening on 5/24/23, 9:30 am. Orders needed for FPFT, Labs; pt had recent CXR. Will forward to pulm nurse pool. [facility]  "

## 2023-05-10 ENCOUNTER — TELEPHONE (OUTPATIENT)
Dept: PULMONOLOGY | Facility: CLINIC | Age: 58
End: 2023-05-10
Payer: COMMERCIAL

## 2023-05-10 DIAGNOSIS — R91.1 LUNG NODULE: Primary | ICD-10-CM

## 2023-05-22 ENCOUNTER — TELEPHONE (OUTPATIENT)
Dept: PULMONOLOGY | Facility: CLINIC | Age: 58
End: 2023-05-22
Payer: COMMERCIAL

## 2023-05-22 ASSESSMENT — ENCOUNTER SYMPTOMS
DIZZINESS: 0
SEIZURES: 0
MEMORY LOSS: 0
SPEECH CHANGE: 0
HEADACHES: 0
LOSS OF CONSCIOUSNESS: 0
TREMORS: 0
DISTURBANCES IN COORDINATION: 0
NUMBNESS: 1
TINGLING: 1
PARALYSIS: 0
WEAKNESS: 0

## 2023-05-22 NOTE — TELEPHONE ENCOUNTER
Patient Contacted for the patient to call back and schedule the following:    Appointment type: NEW  Provider: Rama  Return date: 5/23/23  Specialty phone number: na  Additional appointment(s) needed: na  Additonal Notes: 5/22/23 - Pt was re-scheduled with Dr. Ontiveros on 5/23/23, 7am, per instruction from Lead Pulgerman Mims. [facility]

## 2023-05-23 ENCOUNTER — PRE VISIT (OUTPATIENT)
Dept: PULMONOLOGY | Facility: CLINIC | Age: 58
End: 2023-05-23
Payer: COMMERCIAL

## 2023-05-23 ENCOUNTER — OFFICE VISIT (OUTPATIENT)
Dept: PULMONOLOGY | Facility: CLINIC | Age: 58
End: 2023-05-23
Attending: INTERNAL MEDICINE
Payer: COMMERCIAL

## 2023-05-23 ENCOUNTER — LAB (OUTPATIENT)
Dept: LAB | Facility: CLINIC | Age: 58
End: 2023-05-23
Payer: COMMERCIAL

## 2023-05-23 VITALS
RESPIRATION RATE: 17 BRPM | WEIGHT: 173 LBS | SYSTOLIC BLOOD PRESSURE: 121 MMHG | HEIGHT: 69 IN | HEART RATE: 60 BPM | DIASTOLIC BLOOD PRESSURE: 85 MMHG | OXYGEN SATURATION: 98 % | BODY MASS INDEX: 25.62 KG/M2

## 2023-05-23 DIAGNOSIS — R91.1 LUNG NODULE: ICD-10-CM

## 2023-05-23 DIAGNOSIS — R91.1 LUNG NODULE: Primary | ICD-10-CM

## 2023-05-23 LAB
ALBUMIN SERPL BCG-MCNC: 4.4 G/DL (ref 3.5–5.2)
ALP SERPL-CCNC: 80 U/L (ref 40–129)
ALT SERPL W P-5'-P-CCNC: 15 U/L (ref 10–50)
ANION GAP SERPL CALCULATED.3IONS-SCNC: 6 MMOL/L (ref 7–15)
AST SERPL W P-5'-P-CCNC: 21 U/L (ref 10–50)
BASOPHILS # BLD AUTO: 0 10E3/UL (ref 0–0.2)
BASOPHILS NFR BLD AUTO: 1 %
BILIRUB SERPL-MCNC: 0.5 MG/DL
BUN SERPL-MCNC: 19.1 MG/DL (ref 6–20)
CALCIUM SERPL-MCNC: 9.6 MG/DL (ref 8.6–10)
CHLORIDE SERPL-SCNC: 104 MMOL/L (ref 98–107)
CREAT SERPL-MCNC: 0.89 MG/DL (ref 0.67–1.17)
DEPRECATED HCO3 PLAS-SCNC: 29 MMOL/L (ref 22–29)
EOSINOPHIL # BLD AUTO: 0.2 10E3/UL (ref 0–0.7)
EOSINOPHIL NFR BLD AUTO: 3 %
ERYTHROCYTE [DISTWIDTH] IN BLOOD BY AUTOMATED COUNT: 12.7 % (ref 10–15)
GFR SERPL CREATININE-BSD FRML MDRD: >90 ML/MIN/1.73M2
GLUCOSE SERPL-MCNC: 93 MG/DL (ref 70–99)
HCT VFR BLD AUTO: 46 % (ref 40–53)
HGB BLD-MCNC: 15.6 G/DL (ref 13.3–17.7)
IGG SERPL-MCNC: 865 MG/DL (ref 610–1616)
IMM GRANULOCYTES # BLD: 0 10E3/UL
IMM GRANULOCYTES NFR BLD: 0 %
LYMPHOCYTES # BLD AUTO: 1.3 10E3/UL (ref 0.8–5.3)
LYMPHOCYTES NFR BLD AUTO: 23 %
MCH RBC QN AUTO: 31.2 PG (ref 26.5–33)
MCHC RBC AUTO-ENTMCNC: 33.9 G/DL (ref 31.5–36.5)
MCV RBC AUTO: 92 FL (ref 78–100)
MONOCYTES # BLD AUTO: 0.4 10E3/UL (ref 0–1.3)
MONOCYTES NFR BLD AUTO: 7 %
NEUTROPHILS # BLD AUTO: 3.9 10E3/UL (ref 1.6–8.3)
NEUTROPHILS NFR BLD AUTO: 66 %
NRBC # BLD AUTO: 0 10E3/UL
NRBC BLD AUTO-RTO: 0 /100
PLATELET # BLD AUTO: 248 10E3/UL (ref 150–450)
POTASSIUM SERPL-SCNC: 4.7 MMOL/L (ref 3.4–5.3)
PROT SERPL-MCNC: 7.1 G/DL (ref 6.4–8.3)
RBC # BLD AUTO: 5 10E6/UL (ref 4.4–5.9)
SODIUM SERPL-SCNC: 139 MMOL/L (ref 136–145)
WBC # BLD AUTO: 5.9 10E3/UL (ref 4–11)

## 2023-05-23 PROCEDURE — 36415 COLL VENOUS BLD VENIPUNCTURE: CPT | Performed by: PATHOLOGY

## 2023-05-23 PROCEDURE — G0463 HOSPITAL OUTPT CLINIC VISIT: HCPCS | Performed by: INTERNAL MEDICINE

## 2023-05-23 PROCEDURE — 80053 COMPREHEN METABOLIC PANEL: CPT | Performed by: PATHOLOGY

## 2023-05-23 PROCEDURE — 99000 SPECIMEN HANDLING OFFICE-LAB: CPT | Performed by: PATHOLOGY

## 2023-05-23 PROCEDURE — 82784 ASSAY IGA/IGD/IGG/IGM EACH: CPT | Mod: 90 | Performed by: PATHOLOGY

## 2023-05-23 PROCEDURE — 99214 OFFICE O/P EST MOD 30 MIN: CPT | Performed by: INTERNAL MEDICINE

## 2023-05-23 PROCEDURE — 85025 COMPLETE CBC W/AUTO DIFF WBC: CPT | Performed by: PATHOLOGY

## 2023-05-23 RX ORDER — ITRACONAZOLE 100 MG/1
CAPSULE ORAL DAILY
Status: CANCELLED | OUTPATIENT
Start: 2023-05-23

## 2023-05-23 RX ORDER — ITRACONAZOLE 100 MG/1
CAPSULE ORAL
Qty: 40 CAPSULE | Refills: 2 | Status: SHIPPED | OUTPATIENT
Start: 2023-05-23 | End: 2023-05-24

## 2023-05-23 RX ORDER — ITRACONAZOLE 100 MG/1
200 CAPSULE ORAL DAILY
Qty: 40 CAPSULE | Refills: 2 | Status: SHIPPED | OUTPATIENT
Start: 2023-05-23 | End: 2023-05-23

## 2023-05-23 ASSESSMENT — PAIN SCALES - GENERAL: PAINLEVEL: NO PAIN (0)

## 2023-05-23 NOTE — NURSING NOTE
Chief Complaint   Patient presents with     Consult     New visit     Medications reviewed and vital signs taken.   Rebecca Abernathy CMA

## 2023-05-23 NOTE — LETTER
5/23/2023         RE: Alphonso Hicks  [id number] [place] [facility] [address]        Dear Colleague,    Thank you for referring your patient, Alphonso Hicks, to the [facility] FOR LUNG SCIENCE AND [facility] [place]. Please see a copy of my visit note below.    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 Developed a  ? neck mass in 1-23, underwent CT imaging showing a AMPARO nodule, as well as a new markedly elevated hemidiaphragm.  Seen by Dr. Montgomery in clinic and discussed at the lung nodule conference.  Underwent bronchoscopy by Dr. Huffman with only scant cellular material obtained on biopsy.  Underwent CT guided biopsy in 4-21: presence of necrotic tissue with viable tissue showing chronic inflammation, also presence of fungal infection most consistent with Histoplasmosis.  Denies any cough or chest pain.  Denies any known exposure to birds, chickens or bats. Did help a friend tear down a storage shed last Summer, denies and bird or bat droppings in shed.  Denies any water damage or mold exposure in basement.  No tobacco.    The patient was seen and examined by Carmelo Ontiveros MD           No current outpatient medications on file.     No current facility-administered medications for this visit.     No Known Allergies  Past Medical History:   Diagnosis Date    Dermatitis     Sarcoma (H)     right upper extremity       Past Surgical History:   Procedure Laterality Date    APPENDECTOMY      ARTHROSCOPIC RECONSTRUCTION ANTERIOR CRUCIATE LIGAMENT Right     BRONCHOSCOPY, WITH BIOPSY, ROBOT ASSISTED N/A 3/21/2023    Procedure: BRONCHOSCOPY, WITH BIOPSY, ROBOT ASSISTED  ION;  Surgeon: Estela Allan MD;  Location: [facility] OR    ELBOW SURGERY      ENDOBRONCHIAL ULTRASOUND FLEXIBLE N/A 3/21/2023  "   Procedure: Endobronchial ultrasound flexible with transbronchial biopsy;  Surgeon: Estela Allan MD;  Location: [facility] OR    EXCISE SOFT TISSUE TUMOR ELBOW Right 2/26/2016    Procedure: EXCISE SOFT TISSUE TUMOR ELBOW;  Surgeon: Raleigh Alvarado MD;  Location: [facility] OR    KNEE SURGERY         Social History     Socioeconomic History    Marital status:      Spouse name: Not on file    Number of children: Not on file    Years of education: Not on file    Highest education level: Not on file   Occupational History    Not on file   Tobacco Use    Smoking status: Never    Smokeless tobacco: Never   Vaping Use    Vaping status: Not on file   Substance and Sexual Activity    Alcohol use: Yes     Alcohol/week: 0.0 standard drinks of alcohol     Comment: occ, 3 -4 per week    Drug use: No    Sexual activity: Yes     Partners: Female     Birth control/protection: Male Surgical   Other Topics Concern    Not on file   Social History Narrative    Not on file     Social Determinants of Health     Financial Resource Strain: Not on file   Food Insecurity: Not on file   Transportation Needs: Not on file   Physical Activity: Not on file   Stress: Not on file   Social Connections: Not on file   Intimate Partner Violence: Not on file   Housing Stability: Not on file       FH:  Reviewed with patient, no family history of lung disease  ROS Pulmonary  A complete ROS was otherwise negative except as noted in the HPI.  /85   Pulse 60   Resp 17   Ht 1.753 m (5' 9\")   Wt 78.5 kg (173 lb)   SpO2 98%   BMI 25.55 kg/m    Exam:   GENERAL APPEARANCE: Well developed, well nourished, alert, and in no apparent distress.  EYES: PERRL, EOMI  HENT: Nasal mucosa with no edema and no hyperemia. No nasal polyps.  EARS: Canals clear, TMs normal  MOUTH: Oral mucosa is moist, without any lesions, no tonsillar enlargement, no oropharyngeal exudate.  NECK: supple, no masses, no thyromegaly.  LYMPHATICS: No significant axillary, cervical, or " supraclavicular nodes.  RESP: Good air flow throughout.  No crackles. No rhonchi. No wheezes.  CV: Normal S1, S2, regular rhythm, normal rate. No murmur.  No rub. No gallop. No LE edema.   ABDOMEN:  Bowel sounds normal, soft, nontender, no HSM or masses.   MS: extremities normal. No clubbing. No cyanosis.  SKIN: no rash on limited exam  NEURO: Mentation intact, speech normal, normal strength and tone, normal gait and stance  PSYCH: mentation appears normal. and affect normal/bright  Results:  PFT's from 2-23 were personally reviewed in clinic  FVC 3.23 (76%), FEV-1 2.73 (82%), FEV-1/FVC 85%  FRC 3.60 (102%), %, TLC 89%  DLCO 117%  No airflow limitation.  Decreased FVC.  Normal lung volumes.  Normal diffusion capacity.  No results found for this or any previous visit (from the past 168 hour(s)).    Assessment and plan:   56 YO with history of sarcoma who was found to have a AMPARO pulmonary nodule with biopsy x 2 that did not reveal malignancy but CT guided biopsy with the presence of fungal infection- most consistent with Histoplasmosis.  No clear source.  Needs to initiate treatment. 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    1. Start itraconazole 200 mg per day after load  2. CMP, CBC, IgG level today with repeat CMP and CBC on return visit  3. Repeat chest CT in 5 weeks with return visit  4. Fluoroscopy to evaluate for paralyzed hemidiaphragm    RTC in 5 weeks with repeat chest CT.  Patient advised to contact the clinic with any questions or concerns.          Again, thank you for allowing me to participate in the care of your patient.        Sincerely,        Carmelo Ontiveros MD

## 2023-05-23 NOTE — PROGRESS NOTES
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 Developed a  ? neck mass in 1-23, underwent CT imaging showing a AMPARO nodule, as well as a new markedly elevated hemidiaphragm.  Seen by Dr. Montgomery in clinic and discussed at the lung nodule conference.  Underwent bronchoscopy by Dr. Huffman with only scant cellular material obtained on biopsy.  Underwent CT guided biopsy in 4-21: presence of necrotic tissue with viable tissue showing chronic inflammation, also presence of fungal infection most consistent with Histoplasmosis.  Denies any cough or chest pain.  Denies any known exposure to birds, chickens or bats. Did help a friend tear down a storage shed last Summer, denies and bird or bat droppings in shed.  Denies any water damage or mold exposure in basement.  No tobacco.    The patient was seen and examined by Carmelo Ontiveros MD           No current outpatient medications on file.     No current facility-administered medications for this visit.     No Known Allergies  Past Medical History:   Diagnosis Date     Dermatitis      Sarcoma (H)     right upper extremity       Past Surgical History:   Procedure Laterality Date     APPENDECTOMY       ARTHROSCOPIC RECONSTRUCTION ANTERIOR CRUCIATE LIGAMENT Right      BRONCHOSCOPY, WITH BIOPSY, ROBOT ASSISTED N/A 3/21/2023    Procedure: BRONCHOSCOPY, WITH BIOPSY, ROBOT ASSISTED  ION;  Surgeon: Estela Allan MD;  Location: [facility] OR     ELBOW SURGERY       ENDOBRONCHIAL ULTRASOUND FLEXIBLE N/A 3/21/2023    Procedure: Endobronchial ultrasound flexible with transbronchial biopsy;  Surgeon: Estela Allan MD;  Location: [facility] OR     EXCISE SOFT TISSUE TUMOR ELBOW Right 2/26/2016    Procedure: EXCISE SOFT TISSUE TUMOR ELBOW;  Surgeon: Raleigh Alvarado MD;  Location: [facility] OR     KNEE SURGERY  "        Social History     Socioeconomic History     Marital status:      Spouse name: Not on file     Number of children: Not on file     Years of education: Not on file     Highest education level: Not on file   Occupational History     Not on file   Tobacco Use     Smoking status: Never     Smokeless tobacco: Never   Vaping Use     Vaping status: Not on file   Substance and Sexual Activity     Alcohol use: Yes     Alcohol/week: 0.0 standard drinks of alcohol     Comment: occ, 3 -4 per week     Drug use: No     Sexual activity: Yes     Partners: Female     Birth control/protection: Male Surgical   Other Topics Concern     Not on file   Social History Narrative     Not on file     Social Determinants of Health     Financial Resource Strain: Not on file   Food Insecurity: Not on file   Transportation Needs: Not on file   Physical Activity: Not on file   Stress: Not on file   Social Connections: Not on file   Intimate Partner Violence: Not on file   Housing Stability: Not on file       FH:  Reviewed with patient, no family history of lung disease  ROS Pulmonary  A complete ROS was otherwise negative except as noted in the HPI.  /85   Pulse 60   Resp 17   Ht 1.753 m (5' 9\")   Wt 78.5 kg (173 lb)   SpO2 98%   BMI 25.55 kg/m    Exam:   GENERAL APPEARANCE: Well developed, well nourished, alert, and in no apparent distress.  EYES: PERRL, EOMI  HENT: Nasal mucosa with no edema and no hyperemia. No nasal polyps.  EARS: Canals clear, TMs normal  MOUTH: Oral mucosa is moist, without any lesions, no tonsillar enlargement, no oropharyngeal exudate.  NECK: supple, no masses, no thyromegaly.  LYMPHATICS: No significant axillary, cervical, or supraclavicular nodes.  RESP: Good air flow throughout.  No crackles. No rhonchi. No wheezes.  CV: Normal S1, S2, regular rhythm, normal rate. No murmur.  No rub. No gallop. No LE edema.   ABDOMEN:  Bowel sounds normal, soft, nontender, no HSM or masses.   MS: extremities " normal. No clubbing. No cyanosis.  SKIN: no rash on limited exam  NEURO: Mentation intact, speech normal, normal strength and tone, normal gait and stance  PSYCH: mentation appears normal. and affect normal/bright  Results:  PFT's from 2-23 were personally reviewed in clinic  FVC 3.23 (76%), FEV-1 2.73 (82%), FEV-1/FVC 85%  FRC 3.60 (102%), %, TLC 89%  DLCO 117%  No airflow limitation.  Decreased FVC.  Normal lung volumes.  Normal diffusion capacity.  No results found for this or any previous visit (from the past 168 hour(s)).    Assessment and plan:   58 YO with history of sarcoma who was found to have a AMPARO pulmonary nodule with biopsy x 2 that did not reveal malignancy but CT guided biopsy with the presence of fungal infection- most consistent with Histoplasmosis.  No clear source.  Needs to initiate treatment. 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    1. Start itraconazole 200 mg per day after load  2. CMP, CBC, IgG level today with repeat CMP and CBC on return visit  3. Repeat chest CT in 5 weeks with return visit  4. Fluoroscopy to evaluate for paralyzed hemidiaphragm    RTC in 5 weeks with repeat chest CT.  Patient advised to contact the clinic with any questions or concerns.        Answers for HPI/ROS submitted by the patient on 5/22/2023  General Symptoms: No  Skin Symptoms: No  HENT Symptoms: No  EYE SYMPTOMS: No  HEART SYMPTOMS: No  LUNG SYMPTOMS: No  INTESTINAL SYMPTOMS: No  URINARY SYMPTOMS: No  REPRODUCTIVE SYMPTOMS: No  SKELETAL SYMPTOMS: No  BLOOD SYMPTOMS: No  NERVOUS SYSTEM SYMPTOMS: Yes  MENTAL HEALTH SYMPTOMS: No  Trouble with coordination: No  Dizziness or trouble with balance: No  Fainting or black-out spells: No  Memory loss: No  Headache: No  Seizures: No  Speech problems: No  Tingling: Yes  Tremor: No  Weakness: No  Difficulty walking: No  Paralysis: No  Numbness: Yes

## 2023-05-23 NOTE — TELEPHONE ENCOUNTER
Called pharmacy and requested PA information to be faxed on separate form to send to PA group.    Also emailed Dr Ontiveros to clarify sig.

## 2023-05-23 NOTE — TELEPHONE ENCOUNTER
RECORDS RECEIVED FROM: internal    DATE RECEIVED: 5.23.23    NOTES STATUS DETAILS   OFFICE NOTE from referring provider internal  Nicolle Payne MD   OFFICE NOTE from other specialist     DISCHARGE SUMMARY from hospital     DISCHARGE REPORT from the ER internal  4.20.23 Elmer   3.21.23 Allan    MEDICATION LIST internal     IMAGING  (NEED IMAGES AND REPORTS)     CT SCAN internal  4.20.23, 3.21.23, 2.14.23, 1.12.23   CHEST XRAY (CXR) internal  4.20.23, 3.21.23,    TESTS     PULMONARY FUNCTION TESTING (PFT) internal  2.14.23    Order placed needing to be scheduled        Action 5.23.23 sv    Action Taken Message sent to CC pool to help schedule PFT order

## 2023-05-24 ENCOUNTER — MYC MEDICAL ADVICE (OUTPATIENT)
Dept: PULMONOLOGY | Facility: CLINIC | Age: 58
End: 2023-05-24

## 2023-05-24 ENCOUNTER — TELEPHONE (OUTPATIENT)
Dept: PULMONOLOGY | Facility: CLINIC | Age: 58
End: 2023-05-24

## 2023-05-24 RX ORDER — ITRACONAZOLE 100 MG/1
CAPSULE ORAL
Qty: 40 CAPSULE | Refills: 2 | Status: SHIPPED | OUTPATIENT
Start: 2023-05-24 | End: 2023-07-10

## 2023-05-24 NOTE — TELEPHONE ENCOUNTER
Prior Authorization Retail Medication Request    Medication/Dose: Itraconazole 100mg   ICD code (if different than what is on RX):    Previously Tried and Failed:    Rationale:      Insurance Name:    Insurance ID:        Pharmacy Information (if different than what is on RX)  Name:    Phone:

## 2023-05-26 ENCOUNTER — MYC MEDICAL ADVICE (OUTPATIENT)
Dept: ORTHOPEDICS | Facility: CLINIC | Age: 58
End: 2023-05-26
Payer: COMMERCIAL

## 2023-05-26 NOTE — TELEPHONE ENCOUNTER
Central Prior Authorization Team   Phone: [phone]      PA Initiation    Medication: ITRACONAZOLE 100 MG PO CAPS  Insurance Company: [employer] - Phone [phone] Fax [phone]  Pharmacy Filling the Rx: [facility] [id number] [place] [address]  Filling Pharmacy Phone: [phone]  Filling Pharmacy Fax:    Start Date: 5/26/2023

## 2023-05-26 NOTE — TELEPHONE ENCOUNTER
Writer spoke with patient to explain that a PA is being run for this medication. We are waiting on that before we can adjust quantity.    Patient verbalized understanding and said he would call his insurance to speed up the process.

## 2023-06-02 NOTE — TELEPHONE ENCOUNTER
Prior Authorization Approval    Medication: ITRACONAZOLE 100 MG PO CAPS  Authorization Effective Date: 4/26/2023  Authorization Expiration Date: 5/25/2024  Approved Dose/Quantity:   Reference #: [id number]   Insurance Company: [employer] - Phone [phone] Fax [phone]  Expected CoPay:       CoPay Card Available:      Financial Assistance Needed:   Which Pharmacy is filling the prescription: [facility] [id number] [place] [address]  Pharmacy Notified: Yes  Patient Notified: No

## 2023-06-03 ENCOUNTER — HEALTH MAINTENANCE LETTER (OUTPATIENT)
Age: 58
End: 2023-06-03

## 2023-06-20 ENCOUNTER — HOSPITAL ENCOUNTER (OUTPATIENT)
Dept: GENERAL RADIOLOGY | Facility: CLINIC | Age: 58
Discharge: HOME | End: 2023-06-20
Attending: INTERNAL MEDICINE
Payer: COMMERCIAL

## 2023-06-20 ENCOUNTER — HOSPITAL ENCOUNTER (OUTPATIENT)
Dept: CT IMAGING | Facility: CLINIC | Age: 58
Discharge: HOME | End: 2023-06-20
Attending: INTERNAL MEDICINE
Payer: COMMERCIAL

## 2023-06-20 DIAGNOSIS — R91.1 LUNG NODULE: ICD-10-CM

## 2023-06-20 PROCEDURE — 71250 CT THORAX DX C-: CPT

## 2023-06-20 PROCEDURE — 76000 FLUOROSCOPY <1 HR PHYS/QHP: CPT

## 2023-06-22 ENCOUNTER — MYC MEDICAL ADVICE (OUTPATIENT)
Dept: PULMONOLOGY | Facility: CLINIC | Age: 58
End: 2023-06-22
Payer: COMMERCIAL

## 2023-06-26 ENCOUNTER — TELEPHONE (OUTPATIENT)
Dept: PULMONOLOGY | Facility: CLINIC | Age: 58
End: 2023-06-26
Payer: COMMERCIAL

## 2023-06-26 NOTE — TELEPHONE ENCOUNTER
Writer called patient to relay message from Dr Ontiveros that patient should stay on itraconazole until he appointment with Dr. Ontiveros. Patient verbalized understanding.

## 2023-06-26 NOTE — TELEPHONE ENCOUNTER
Writer called patient to see if he would like to reschedule with a different provider. Appointment available with Dr. Hameed on 7/10 at 8am with labs before at 7:30. Patient agreed to earlier appointment.

## 2023-06-26 NOTE — TELEPHONE ENCOUNTER
Patient Contacted     Appointment type: RTN  Provider: CORINA  Return date: 10/10/23  Specialty phone number: [phone]  Additional appointment(s) needed: LAB  Additonal Notes: rescheduled from 6/27/23.

## 2023-06-29 ENCOUNTER — OFFICE VISIT (OUTPATIENT)
Dept: ORTHOPEDICS | Facility: CLINIC | Age: 58
End: 2023-06-29
Payer: COMMERCIAL

## 2023-06-29 DIAGNOSIS — R20.0 RIGHT UPPER EXTREMITY NUMBNESS: Primary | ICD-10-CM

## 2023-06-29 DIAGNOSIS — R20.0 RIGHT ARM NUMBNESS: ICD-10-CM

## 2023-06-29 DIAGNOSIS — C49.11 SOFT TISSUE SARCOMA OF UPPER EXTREMITY, RIGHT (H): Primary | ICD-10-CM

## 2023-06-29 PROCEDURE — 99214 OFFICE O/P EST MOD 30 MIN: CPT | Performed by: ORTHOPAEDIC SURGERY

## 2023-06-29 NOTE — NURSING NOTE
Chief Complaint   Patient presents with     RECHECK     Return for tingling and numbness in right arm. Patient states that the numbness and tingling started about 3 to 6 months ago. Patient states that they stopped working out and that helped. The patient states that the numbness is not constant, but they experience it every day.       57 year old  1965    There were no vitals taken for this visit.    Past Surgical History:   Procedure Laterality Date     APPENDECTOMY       ARTHROSCOPIC RECONSTRUCTION ANTERIOR CRUCIATE LIGAMENT Right      BRONCHOSCOPY, WITH BIOPSY, ROBOT ASSISTED N/A 3/21/2023    Procedure: BRONCHOSCOPY, WITH BIOPSY, ROBOT ASSISTED  ION;  Surgeon: Estela Allan MD;  Location[facility]     ELBOW SURGERY       ENDOBRONCHIAL ULTRASOUND FLEXIBLE N/A 3/21/2023    Procedure: Endobronchial ultrasound flexible with transbronchial biopsy;  Surgeon: Estela Allan MD;  Location: [facility] OR     EXCISE SOFT TISSUE TUMOR ELBOW Right 2/26/2016    Procedure: EXCISE SOFT TISSUE TUMOR ELBOW;  Surgeon: Raleigh Alvarado MD;  Location[facility] OR     KNEE SURGERY                Pain Assessment  Patient Currently in Pain: Denies                           [facility] [place] [address]        No Known Allergies        Current Outpatient Medications   Medication     itraconazole (SPORANOX) 100 MG capsule     No current facility-administered medications for this visit.             Questionnaires:    HOOS Hip Dysfunction & Osteoarthritis Outcome Questionnaire         No data to display                       KOOS Knee Survey Assessment         No data to display                       Promis 10 Assessment        6/26/2023    10:26 AM   PROMIS 10   In general, would you say your health is: Very good   In general, would you say your quality of life is: Excellent   In general, how would you rate your physical health? Very good   In general, how would you rate your mental health, including your mood and  your ability to think? Excellent   In general, how would you rate your satisfaction with your social activities and relationships? Excellent   In general, please rate how well you carry out your usual social activities and roles Excellent   To what extent are you able to carry out your everyday physical activities such as walking, climbing stairs, carrying groceries, or moving a chair? Completely   In the past 7 days, how often have you been bothered by emotional problems such as feeling anxious, depressed, or irritable? Never   In the past 7 days, how would you rate your fatigue on average? None   In the past 7 days, how would you rate your pain on average, where 0 means no pain, and 10 means worst imaginable pain? 1   In general, would you say your health is: 4   In general, would you say your quality of life is: 5   In general, how would you rate your physical health? 4   In general, how would you rate your mental health, including your mood and your ability to think? 5   In general, how would you rate your satisfaction with your social activities and relationships? 5   In general, please rate how well you carry out your usual social activities and roles. (This includes activities at home, at work and in your community, and responsibilities as a parent, child, spouse, employee, friend, etc.) 5   To what extent are you able to carry out your everyday physical activities such as walking, climbing stairs, carrying groceries, or moving a chair? 5   In the past 7 days, how often have you been bothered by emotional problems such as feeling anxious, depressed, or irritable? 1   In the past 7 days, how would you rate your fatigue on average? 1   In the past 7 days, how would you rate your pain on average, where 0 means no pain, and 10 means worst imaginable pain? 1   Global Mental Health Score 20   Global Physical Health Score 18   PROMIS TOTAL - SUBSCORES 38              Or[facility] [place] Knee Questionnaire         No data  to display

## 2023-06-29 NOTE — LETTER
6/29/2023         RE: Alphonso Hicks  [id number] [place] [address]        Dear Colleague,    Thank you for referring your patient, Alphonso Hicks, to the [facility] [place]. Please see a copy of my visit note below.        [facility], Orthopaedic Oncology Surgery Consultation  by Raleigh Alvarado M.D.    Alphonso Hicks MRN# [id number]   Age: 53 year old YOB: 1965     Requesting physician: Prieto Santos     Background history:  DX:  High grade myxofibrosarcoma, Right arm. Tumor size is 3 cm, superficial, high grade     TREATMENTS:  11/11/2015, Right arm sarcoma removal, (Dr. Eid) [facility]  External beam RT, 50 Gy, (Dr. Hugo Coe) [place]  2/26/2016, Wide excision of tumor bed from previously excised myxofibrosarcoma (Christiano) [facility]      I met with Amaury who noted that he is having some difficulty with his right arm in terms of shoulder pain as well as numbness and tingling along the little finger of his right hand. He has been undergoing care with Dr. Hugo Ontiveros of our pulmonary service and the the nodules within his lung are likely of fungal origin and he has been treated with oral itraconazole.  Most recently, he has been diagnosed with a elevated diaphragm on the right side raising concern for phrenic nerve involvement or dysfunction.    On examination:  I find no evidence of palpable mass along the ulnar border of his right elbow in the area of the tumor excision.  Tinel's sign over the cubital tunnel and ulnar nerve is noted and does reproduce his symptoms.    Impression:  Continuously disease-free with regards to his right arm myxofibrosarcoma.  Right shoulder discomfort, likely degenerative rotator cuff pathology.  I will arrange for MRI examination.  If pathology identified, follow-up with our shoulder surgeon team would be appropriate.    MRI examination of the right elbow was performed 6 months ago and  shows no evidence of tumor recurrence.    MRI examination of cervical spine is notable for multilevel pathology that might responsible for pain generation with radicular symptoms right upper extremity.  I will have him follow-up with our cervical spine team.    MD Isabel Siu Family Professor  Oncology and Adult Reconstructive Surgery  Dept Orthopaedic Surgery, [facility]  [phone] office, [phone] pager  [email or web address]    Total combined visit time and work time before and after clinic visit on encounter date = 20 min

## 2023-06-29 NOTE — PROGRESS NOTES
[facility], Orthopaedic Oncology Surgery Consultation  by Raleigh Alvarado M.D.    Alphonso Hicks MRN# [id number]   Age: 53 year old YOB: 1965     Requesting physician: Prieto Santos     Background history:  DX:  1. High grade myxofibrosarcoma, Right arm. Tumor size is 3 cm, superficial, high grade     TREATMENTS:  1. 11/11/2015, Right arm sarcoma removal, (Dr. Eid) [facility]  2. External beam RT, 50 Gy, (Dr. Hugo Coe) [place]  3. 2/26/2016, Wide excision of tumor bed from previously excised myxofibrosarcoma (Christiano) [facility]      I met with Amaury who noted that he is having some difficulty with his right arm in terms of shoulder pain as well as numbness and tingling along the little finger of his right hand. He has been undergoing care with Dr. Hugo Ontiveros of our pulmonary service and the the nodules within his lung are likely of fungal origin and he has been treated with oral itraconazole.  Most recently, he has been diagnosed with a elevated diaphragm on the right side raising concern for phrenic nerve involvement or dysfunction.    On examination:  I find no evidence of palpable mass along the ulnar border of his right elbow in the area of the tumor excision.  Tinel's sign over the cubital tunnel and ulnar nerve is noted and does reproduce his symptoms.    Impression:  Continuously disease-free with regards to his right arm myxofibrosarcoma.  Right shoulder discomfort, likely degenerative rotator cuff pathology.  I will arrange for MRI examination.  If pathology identified, follow-up with our shoulder surgeon team would be appropriate.    MRI examination of the right elbow was performed 6 months ago and shows no evidence of tumor recurrence.    MRI examination of cervical spine is notable for multilevel pathology that might responsible for pain generation with radicular symptoms right upper extremity.  I will have him follow-up with our cervical spine  team.    MD Isabel Siu Family Professor  Oncology and Adult Reconstructive Surgery  Dept Orthopaedic Surgery, [facility]  [phone] office, [phone] pager  [email or web address]    Total combined visit time and work time before and after clinic visit on encounter date = 20 min

## 2023-06-30 ENCOUNTER — PRE VISIT (OUTPATIENT)
Dept: ORTHOPEDICS | Facility: CLINIC | Age: 58
End: 2023-06-30
Payer: COMMERCIAL

## 2023-06-30 NOTE — TELEPHONE ENCOUNTER
DIAGNOSIS: RT Shoulder - Patient has an appt with [facility] for Cervical Spine!  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 Ortho   OFFICE NOTE from other specialist Internal    OPERATIVE REPORT EPIC Internal:  02/26/2016 - RT Elbow Tumor Bed Excision w/ Soft Tissue Flap    Care Everywhere:  11/11/2015 - RT Elbow Cyst Excision   MEDICATION LIST Internal    LABS     MRI Internal    CT SCAN Internal    XRAYS (IMAGES & REPORTS) Internal

## 2023-06-30 NOTE — TELEPHONE ENCOUNTER
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

## 2023-07-03 DIAGNOSIS — M25.511 RIGHT SHOULDER PAIN, UNSPECIFIED CHRONICITY: Primary | ICD-10-CM

## 2023-07-03 ASSESSMENT — ENCOUNTER SYMPTOMS
MEMORY LOSS: 0
DIZZINESS: 0
SPEECH CHANGE: 0
WEAKNESS: 0
TINGLING: 1
SEIZURES: 0
NUMBNESS: 1
DIZZINESS: 0
WEAKNESS: 0
SEIZURES: 0
TREMORS: 0
SPEECH CHANGE: 0
DISTURBANCES IN COORDINATION: 0
TREMORS: 0
LOSS OF CONSCIOUSNESS: 0
PARALYSIS: 0
MEMORY LOSS: 0
TINGLING: 1
HEADACHES: 0
HEADACHES: 0
NUMBNESS: 1
LOSS OF CONSCIOUSNESS: 0
DISTURBANCES IN COORDINATION: 0
PARALYSIS: 0

## 2023-07-03 NOTE — PROGRESS NOTES
CHIEF COMPLAINT: Right shoulder pain    DIAGNOSIS: Right shoulder upper rolled border of the subscapularis tendinopathy with subluxation of the biceps tendon    OCCUPATION/SPORT: , computer work    HPI:   Alphonso Hicks is a very pleasant 57 year old, right-hand dominant male who presents for evaluation of right shoulder pain.    Symptoms started 1-2 years. There was not a specific precipitating event, but he thinks it hurt after he was  pressing. He was working out and then felt pain. He then took time off and went to work out a gain and had pain again.  The pain is located deep into the shoulder joint. Worst pain is rated a 8/9 of 10, and current pain is rated at 2 of 10. Symptoms are worsened by lifting over head. Symptoms are improved with rest, avoidance. Patient has tried no treatments as of yet. Associated symptoms include sharp and burning. Patient has symptoms radiating down the arm, with numbness.     Notably, the patient has had a history of a right arm myxofibrosarcoma, incision over posterior aspect of elbow, and radiation that is being managed by Dr. Alvarado 2015. Of note, he recently thought he had a recurrence in his lungs and back, but this has been negative. 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 He states he is seeing Dr. Jorge Alberto kelly for any spinal masses. No other concerns or complaints at this time.    SANE R - 50/60 L - 100    PAST MEDICAL HISTORY:  Past Medical History:   Diagnosis Date     Dermatitis      Sarcoma (H)     right upper extremity       PAST SURGICAL HISTORY:  Past Surgical History:   Procedure Laterality Date     APPENDECTOMY       ARTHROSCOPIC RECONSTRUCTION ANTERIOR CRUCIATE LIGAMENT Right      BRONCHOSCOPY, WITH BIOPSY, ROBOT ASSISTED N/A 3/21/2023    Procedure: BRONCHOSCOPY, WITH BIOPSY, ROBOT ASSISTED  ION;  Surgeon: Estela Allan MD;   Location: [facility] OR     ELBOW SURGERY       ENDOBRONCHIAL ULTRASOUND FLEXIBLE N/A 3/21/2023    Procedure: Endobronchial ultrasound flexible with transbronchial biopsy;  Surgeon: Estela Allan MD;  Location: [facility] OR     EXCISE SOFT TISSUE TUMOR ELBOW Right 2/26/2016    Procedure: EXCISE SOFT TISSUE TUMOR ELBOW;  Surgeon: Raleigh Alvarado MD;  [facility] OR     KNEE SURGERY         CURRENT MEDICATIONS:  Current Outpatient Medications   Medication Sig Dispense Refill     itraconazole (SPORANOX) 100 MG capsule TAKE 2 CAPSULES BY MOUTH 3 TIMES DAILY FOR 3 DAYS, THEN TAKE 2 CAPSULES DAILY. 40 capsule 2     ALLERGIES:    No Known Allergies    FAMILY HISTORY: No pertinent family history, reviewed in EMR.    SOCIAL HISTORY:   Social History     Socioeconomic History     Marital status:      Spouse name: Not on file     Number of children: Not on file     Years of education: Not on file     Highest education level: Not on file   Occupational History     Not on file   Tobacco Use     Smoking status: Never     Smokeless tobacco: Never   Substance and Sexual Activity     Alcohol use: Yes     Alcohol/week: 0.0 standard drinks of alcohol     Comment: occ, 3 -4 per week     Drug use: No     Sexual activity: Yes     Partners: Female     Birth control/protection: Male Surgical   Other Topics Concern     Not on file   Social History Narrative     Not on file     Social Determinants of Health     Financial Resource Strain: Not on file   Food Insecurity: Not on file   Transportation Needs: Not on file   Physical Activity: Not on file   Stress: Not on file   Social Connections: Not on file   Intimate Partner Violence: Not on file   Housing Stability: Not on file     REVIEW OF SYSTEMS: Positive for that noted in past medical history and history of present illness and otherwise reviewed in EMR    PHYSICAL EXAM:  Constitutional: Well-developed, well-nourished, healthy appearing male with paleness over the right arm.  Skin: Warm, dry    HEENT: Normal  Cardiac: Well perfused extremities, strong 2+ peripheral pulses. No edema.   Pulmonary: Breathing room air    Musculoskeletal:   Right Shoulder:  AROM right shoulder: 160/160/50/L1   AROM left shoulder: 170/170/50/L1   PROM right shoulder: 170/170/  4/5 supraspinatus, 4+/5 infraspinatus, 4/5 subscapularis  Positive AC joint pain, negative cross body adduction  Positive Neer and Garg impingement signs  positive belly-press/lift-off  Negative Speed's test  Positive Apprehension  No obvious atrophy  Neurovascular exam and cervical spine exam are normal.    X-RAYS:   AP, lateral, zanca, and axillary radiographs of the right shoulder were ordered and reviewed by me personally showing moderate degenerative changes of the AC joint.  Preserved glenohumeral joint space.    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 Foster) with rotator cuff tendinopathy, upper subscap tear and biceps subluxation    PLAN:   I discussed with the patient the etiology of their condition.  We went through the results of the MRI today.  I discussed with the patient that I see evidence of partial tearing of the posterior superior rotator cuff with tearing of the upper border of the subscapularis with subluxation of the biceps tendon.  We discussed options including conservative versus surgical intervention.  Conservatively we discussed activity modification, over-the-counter anti-inflammatories, physical therapy, potential for ultrasound-guided biceps tendon sheath injection.  We did discuss that subscapularis tears and biceps  subluxation sometimes do not see is significant in his clinic from physical therapy but given that surgery could exacerbate the numbness and tingling that the patient experiences in the upper extremity that a trial of first nonoperative treatment with the more beneficial.  Patient is going to start with physical therapy.  Can follow-up in 3 to 4 months if needed.  At the conclusion of the office visit, Alphonso verbally acknowledged that I answered all of his questions satisfactorily.    Shelbi Payne MD  Orthopedic Surgery Sports Medicine and Shoulder Surgery

## 2023-07-05 ENCOUNTER — OFFICE VISIT (OUTPATIENT)
Dept: ORTHOPEDICS | Facility: CLINIC | Age: 58
End: 2023-07-05
Payer: COMMERCIAL

## 2023-07-05 ENCOUNTER — PRE VISIT (OUTPATIENT)
Dept: ORTHOPEDICS | Facility: CLINIC | Age: 58
End: 2023-07-05

## 2023-07-05 ENCOUNTER — ANCILLARY PROCEDURE (OUTPATIENT)
Dept: GENERAL RADIOLOGY | Facility: CLINIC | Age: 58
End: 2023-07-05
Attending: ORTHOPAEDIC SURGERY
Payer: COMMERCIAL

## 2023-07-05 ENCOUNTER — HOSPITAL ENCOUNTER (OUTPATIENT)
Dept: MRI IMAGING | Facility: CLINIC | Age: 58
Discharge: HOME | End: 2023-07-05
Attending: ORTHOPAEDIC SURGERY | Admitting: ORTHOPAEDIC SURGERY
Payer: COMMERCIAL

## 2023-07-05 DIAGNOSIS — R20.0 RIGHT ARM NUMBNESS: ICD-10-CM

## 2023-07-05 DIAGNOSIS — M25.511 RIGHT SHOULDER PAIN, UNSPECIFIED CHRONICITY: ICD-10-CM

## 2023-07-05 DIAGNOSIS — C49.11 SOFT TISSUE SARCOMA OF UPPER EXTREMITY, RIGHT (H): ICD-10-CM

## 2023-07-05 DIAGNOSIS — M75.111 INCOMPLETE ROTATOR CUFF TEAR OR RUPTURE OF RIGHT SHOULDER, NOT SPECIFIED AS TRAUMATIC: Primary | ICD-10-CM

## 2023-07-05 DIAGNOSIS — M47.812 CERVICAL SPONDYLOSIS: ICD-10-CM

## 2023-07-05 DIAGNOSIS — M48.02 CERVICAL STENOSIS OF SPINE: Primary | ICD-10-CM

## 2023-07-05 DIAGNOSIS — G56.21 ULNAR NEUROPATHY AT ELBOW, RIGHT: ICD-10-CM

## 2023-07-05 DIAGNOSIS — R20.0 RIGHT UPPER EXTREMITY NUMBNESS: ICD-10-CM

## 2023-07-05 PROCEDURE — 72050 X-RAY EXAM NECK SPINE 4/5VWS: CPT | Performed by: RADIOLOGY

## 2023-07-05 PROCEDURE — 73030 X-RAY EXAM OF SHOULDER: CPT | Mod: RT | Performed by: RADIOLOGY

## 2023-07-05 PROCEDURE — 73223 MRI JOINT UPR EXTR W/O&W/DYE: CPT | Mod: RT

## 2023-07-05 PROCEDURE — 73223 MRI JOINT UPR EXTR W/O&W/DYE: CPT | Mod: 26 | Performed by: RADIOLOGY

## 2023-07-05 PROCEDURE — 99214 OFFICE O/P EST MOD 30 MIN: CPT | Mod: GC | Performed by: ORTHOPAEDIC SURGERY

## 2023-07-05 PROCEDURE — 255N000002 HC RX 255 OP 636: Performed by: ORTHOPAEDIC SURGERY

## 2023-07-05 PROCEDURE — 99214 OFFICE O/P EST MOD 30 MIN: CPT | Performed by: ORTHOPAEDIC SURGERY

## 2023-07-05 PROCEDURE — A9585 GADOBUTROL INJECTION: HCPCS | Performed by: ORTHOPAEDIC SURGERY

## 2023-07-05 RX ORDER — GADOBUTROL 604.72 MG/ML
8 INJECTION INTRAVENOUS ONCE
Status: COMPLETED | OUTPATIENT
Start: 2023-07-05 | End: 2023-07-05

## 2023-07-05 RX ADMIN — GADOBUTROL 8 ML: 604.72 INJECTION INTRAVENOUS at 06:29

## 2023-07-05 NOTE — PROGRESS NOTES
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  Patient is seen jointly today in the shoulder clinic as well.  States that he is right-handed and all of his symptoms are to his right upper extremity.  Denies any left upper extremity symptoms.  Does endorse some numbness/tingling down the ulnar aspect of his arm into his small finger.  Denies any paresthesias and any other nerve distribution.  Does feel like his right upper extremity is weaker than the left.  States that this primarily occurs with overhead lifting type of movements.  He has stopped going to the gym and running as much due to pain and weakness.  He is however able to still complete all of his activities of daily living.  With regard to his neck, he does not endorse neck pain. He has not completed any formal physical therapy or received any injections for cervical spondylosis or stenosis.    Patient denies loss of manual dexterity, loss of balance or coordination, saddle anesthesia, loss of bowel or bladder control.     Past treatments tried:  - Physical therapy: None  - Injections: None  - Medications: None         Past Medical History:     Past Medical History:   Diagnosis Date     Dermatitis      Sarcoma (H)     right upper extremity            Past Surgical History:     Past Surgical History:   Procedure Laterality Date     APPENDECTOMY       ARTHROSCOPIC RECONSTRUCTION ANTERIOR CRUCIATE LIGAMENT Right      BRONCHOSCOPY, WITH BIOPSY, ROBOT  ASSISTED N/A 3/21/2023    Procedure: BRONCHOSCOPY, WITH BIOPSY, ROBOT ASSISTED  ION;  Surgeon: Estela Allan MD;  Location: [facility]     ELBOW SURGERY       ENDOBRONCHIAL ULTRASOUND FLEXIBLE N/A 3/21/2023    Procedure: Endobronchial ultrasound flexible with transbronchial biopsy;  Surgeon: Estela Allan MD;  Location: [facility]     EXCISE SOFT TISSUE TUMOR ELBOW Right 2/26/2016    Procedure: EXCISE SOFT TISSUE TUMOR ELBOW;  Surgeon: Raleigh Alvarado MD;  Location: [facility]     KNEE SURGERY              Social History:     Social History     Tobacco Use     Smoking status: Never     Smokeless tobacco: Never   Substance Use Topics     Alcohol use: Yes     Alcohol/week: 0.0 standard drinks of alcohol     Comment: occ, 3 -4 per week            Family History:     Family History   Problem Relation Age of Onset     Stomach Cancer Maternal Grandmother      Breast Cancer Mother      Prostate Cancer Father             Allergies:   No Known Allergies         Medications:     Current Outpatient Medications   Medication     itraconazole (SPORANOX) 100 MG capsule     No current facility-administered medications for this visit.             Review of Systems:     A 10 point ROS was performed and reviewed. Specific responses to these questions are noted at the end of the document.         Physical Exam:     PHYSICAL EXAM:   Constitutional - Patient is healthy, well-nourished and appears stated age, is in no acute distress    Vitals: There were no vitals taken for this visit.   Respiratory - Patient is breathing normally, no audible wheeze or respiratory distress.   Skin - No suspicious rashes or lesions.   Psychiatric - Normal mood and affect.   Cardiovascular - Extremities warm and well perfused.   GI - No abdominal distention.   Musculoskeletal - Non-antalgic gait without use of assistive devices.                  Cervical Spine:    Appearance -  normal appearing cervical lordosis,  no scars or lesions, no chin on chest  deformity    GAIT: Tandem gait without assistive device or evidence of dysmetria. Is able to walk on tip toes and heels without difficulty.    Palpation - tender to palpation over paraspinal muscles, trapezius, base of occiput    ROM - range of motion is slightly decreased in cervical flexion and extension, lateral rotation, lateral bend    Motor -        Upper EXTREMITY Left Right   Shoulder Abduction 5/5 4+/5   Shoulder Flexion 5/5 5/5   Elbow Flexion 5/5 5/5   Elbow Extension 5/5 5/5   Wrist Extension 5/5 5/5   Wrist Flexion 5/5 5/5   Finger Adbuction 5/5 5/5        Special tests -     Spurling's - Negative     Axial Compression- Negative     Neurologic - Sensation intact to light touch bilaterally.      REFLEXES Left Right   Biceps 2+ 2+   Triceps 2+ 2+   Brachioradialis 2+ 2+   Pimentel's No No                Imaging:   We ordered and independently reviewed new radiographs at this clinic visit. The results were discussed with the patient.  Findings include:    Radiographs of cervical spine dated 07/05/2023 demonstrate multilevel cervical spondylosis with ventral osteophytosis from C4-C7 including very large bridging osteotphyte at C5-6.  Cervical lordosis is generally well-maintained without evidence of large derangement in C2-C7 SVA.      MRI of the cervical spine dated 01/02/2023 demonstrates multilevel cervical spinal stenosis including C3-4 (with cord effacement), c4-5 (severe cord effacement), C5-6 (with cord effacement), C6-7(slight left sided cord effacement). There is concomitant neuroforaminal stenosis at these levels. Of note he does have severe right sided C4-5 foraminal stenosis which can manifest as lateral shoulder pain very similar to rotator cuff or bursal pathology.               Assessment and Plan:   Assessment:  57 year old male with:  1. Right shoulder impingement  2. Right cubital tunnel syndrome  3. Possible right C5 radiculopathy  4. Cervical spine degenerative changes with severe  stenosis and cord effacement without presence of myelopathy       Plan:  1. Patient is set to undergo physical therapy for his right shoulder.  We advised that it would also be reasonable for him to participate in physical therapy for his neck.  2. Did discuss that he would likely be a candidate for a transforaminal nerve block particular at the right C5 level if therapy is not able to provide him with significant relief  3. Discussed that the patient does seem to be having symptoms of cubital tunnel syndrome.  Advised the patient on night splinting to see if this assists with his numbness and tingling to his right hand in the ulnar nerve distribution  4. We did discuss symptoms of myelopathy which are not present at the moment.  If the patient does begin to have more myelopathic type of symptoms, discussed that this would potentially push us more towards some sort of decompression and fusion in the future.  The patient was understanding of this as well.  5. Return to clinic in approximately 3 months for repeat evaluation following physical therapy and for discussion of other treatment modalities      Carmelo Cruz MD  Orthopaedic Surgery PGY-4    I reviewed the patient's history, physical examination and imaging studies with the Resident. In addition I individually examined the patient and developed the treatment plan.  I agree with the assessment and plan as documented.     Time spent on this clinical encounter including previsit chart review, history and physical examination, patient counseling and documentation was 45 minutes on the date of encounter.      Respectfully,  Matt Azul MD  Spine Surgery  [facility]

## 2023-07-05 NOTE — LETTER
7/5/2023         RE: Alphonso Hicks  [id number]  [facility] [address]        Dear Colleague,    Thank you for referring your patient, Alphonso Hicks, to the [facility] [place]. Please see a copy of my visit note below.    CHIEF COMPLAINT: Right shoulder pain    DIAGNOSIS: Right shoulder upper rolled border of the subscapularis tendinopathy with subluxation of the biceps tendon    OCCUPATION/SPORT: , computer work    HPI:   Alphonso Hicks is a very pleasant 57 year old, right-hand dominant male who presents for evaluation of right shoulder pain.    Symptoms started 1-2 years. There was not a specific precipitating event, but he thinks it hurt after he was  pressing. He was working out and then felt pain. He then took time off and went to work out a gain and had pain again.  The pain is located deep into the shoulder joint. Worst pain is rated a 8/9 of 10, and current pain is rated at 2 of 10. Symptoms are worsened by lifting over head. Symptoms are improved with rest, avoidance. Patient has tried no treatments as of yet. Associated symptoms include sharp and burning. Patient has symptoms radiating down the arm, with numbness.     Notably, the patient has had a history of a right arm myxofibrosarcoma, incision over posterior aspect of elbow, and radiation that is being managed by Dr. Alvarado 2015. Of note, he recently thought he had a recurrence in his lungs and back, but this has been negative. 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 He states he is seeing Dr. Azul precautionary for any spinal masses. No other concerns or complaints at this time.    SANE R - 50/60 L - 100    PAST MEDICAL HISTORY:  Past Medical History:   Diagnosis Date    Dermatitis     Sarcoma (H)     right upper extremity       PAST SURGICAL HISTORY:  Past Surgical History:    Procedure Laterality Date    APPENDECTOMY      ARTHROSCOPIC RECONSTRUCTION ANTERIOR CRUCIATE LIGAMENT Right     BRONCHOSCOPY, WITH BIOPSY, ROBOT ASSISTED N/A 3/21/2023    Procedure: BRONCHOSCOPY, WITH BIOPSY, ROBOT ASSISTED  ION;  Surgeon: Estela Allan MD;  Location: [facility]    ELBOW SURGERY      ENDOBRONCHIAL ULTRASOUND FLEXIBLE N/A 3/21/2023    Procedure: Endobronchial ultrasound flexible with transbronchial biopsy;  Surgeon: Estela Allan MD;  Location: [facility]    EXCISE SOFT TISSUE TUMOR ELBOW Right 2/26/2016    Procedure: EXCISE SOFT TISSUE TUMOR ELBOW;  Surgeon: Raleigh Alvarado MD;  Location: [facility]    KNEE SURGERY         CURRENT MEDICATIONS:  Current Outpatient Medications   Medication Sig Dispense Refill    itraconazole (SPORANOX) 100 MG capsule TAKE 2 CAPSULES BY MOUTH 3 TIMES DAILY FOR 3 DAYS, THEN TAKE 2 CAPSULES DAILY. 40 capsule 2     ALLERGIES:    No Known Allergies    FAMILY HISTORY: No pertinent family history, reviewed in EMR.    SOCIAL HISTORY:   Social History     Socioeconomic History    Marital status:      Spouse name: Not on file    Number of children: Not on file    Years of education: Not on file    Highest education level: Not on file   Occupational History    Not on file   Tobacco Use    Smoking status: Never    Smokeless tobacco: Never   Substance and Sexual Activity    Alcohol use: Yes     Alcohol/week: 0.0 standard drinks of alcohol     Comment: occ, 3 -4 per week    Drug use: No    Sexual activity: Yes     Partners: Female     Birth control/protection: Male Surgical   Other Topics Concern    Not on file   Social History Narrative    Not on file     Social Determinants of Health     Financial Resource Strain: Not on file   Food Insecurity: Not on file   Transportation Needs: Not on file   Physical Activity: Not on file   Stress: Not on file   Social Connections: Not on file   Intimate Partner Violence: Not on file   Housing Stability: Not on file     REVIEW OF SYSTEMS:  Positive for that noted in past medical history and history of present illness and otherwise reviewed in EMR    PHYSICAL EXAM:  Constitutional: Well-developed, well-nourished, healthy appearing male with paleness over the right arm.  Skin: Warm, dry   HEENT: Normal  Cardiac: Well perfused extremities, strong 2+ peripheral pulses. No edema.   Pulmonary: Breathing room air    Musculoskeletal:   Right Shoulder:  AROM right shoulder: 160/160/50/L1   AROM left shoulder: 170/170/50/L1   PROM right shoulder: 170/170/  4/5 supraspinatus, 4+/5 infraspinatus, 4/5 subscapularis  Positive AC joint pain, negative cross body adduction  Positive Neer and Garg impingement signs  positive belly-press/lift-off  Negative Speed's test  Positive Apprehension  No obvious atrophy  Neurovascular exam and cervical spine exam are normal.    X-RAYS:   AP, lateral, zanca, and axillary radiographs of the right shoulder were ordered and reviewed by me personally showing moderate degenerative changes of the AC joint.  Preserved glenohumeral joint space.    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 Foster) with rotator cuff tendinopathy, upper subscap tear and biceps subluxation    PLAN:   I discussed with the patient the etiology of their condition.  We went through the results of the MRI today.  I discussed with the patient that I see evidence of partial tearing of the posterior superior rotator cuff with tearing of the upper border of the subscapularis with subluxation of the biceps tendon.  We discussed options including conservative versus  surgical intervention.  Conservatively we discussed activity modification, over-the-counter anti-inflammatories, physical therapy, potential for ultrasound-guided biceps tendon sheath injection.  We did discuss that subscapularis tears and biceps subluxation sometimes do not see is significant in his clinic from physical therapy but given that surgery could exacerbate the numbness and tingling that the patient experiences in the upper extremity that a trial of first nonoperative treatment with the more beneficial.  Patient is going to start with physical therapy.  Can follow-up in 3 to 4 months if needed.  At the conclusion of the office visit, Alphonso verbally acknowledged that I answered all of his questions satisfactorily.    Shelbi Payne MD  Orthopedic Surgery Sports Medicine and Shoulder Surgery

## 2023-07-05 NOTE — LETTER
7/5/2023         RE: Alphonso Hicks  [id number] [facility] [address]        Dear Colleague,    Thank you for referring your patient, Alphonso Hicks, to the [facility] [place]. Please see a copy of my visit note below.    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  Patient is seen jointly today in the shoulder clinic as well.  States that he is right-handed and all of his symptoms are to his right upper extremity.  Denies any left upper extremity symptoms.  Does endorse some numbness/tingling down the ulnar aspect of his arm into his small finger.  Denies any paresthesias and any other nerve distribution.  Does feel like his right upper extremity is weaker than the left.  States that this primarily occurs with overhead lifting type of movements.  He has stopped going to the gym and running as much due to pain and weakness.  He is however able to still complete all of his activities of daily living.  With regard to his neck, he does not endorse neck pain. He has not completed any formal physical therapy or received any injections for cervical spondylosis or stenosis.    Patient denies loss of manual dexterity, loss of balance or coordination, saddle anesthesia, loss of bowel or bladder control.     Past treatments tried:  - Physical therapy: None  - Injections: None  - Medications: None         Past Medical History:     Past Medical History:   Diagnosis Date     Dermatitis     Sarcoma (H)     right upper extremity            Past Surgical History:     Past Surgical History:   Procedure Laterality Date    APPENDECTOMY      ARTHROSCOPIC RECONSTRUCTION ANTERIOR CRUCIATE LIGAMENT Right     BRONCHOSCOPY, WITH BIOPSY, ROBOT ASSISTED N/A 3/21/2023    Procedure: BRONCHOSCOPY, WITH BIOPSY, ROBOT ASSISTED  ION;  Surgeon: Estela Allan MD;  Location: [facility]    ELBOW SURGERY      ENDOBRONCHIAL ULTRASOUND FLEXIBLE N/A 3/21/2023    Procedure: Endobronchial ultrasound flexible with transbronchial biopsy;  Surgeon: Estela Allan MD;  Location: [facility]    EXCISE SOFT TISSUE TUMOR ELBOW Right 2/26/2016    Procedure: EXCISE SOFT TISSUE TUMOR ELBOW;  Surgeon: Raleigh Alvarado MD;  Location: [facility]    KNEE SURGERY              Social History:     Social History     Tobacco Use    Smoking status: Never    Smokeless tobacco: Never   Substance Use Topics    Alcohol use: Yes     Alcohol/week: 0.0 standard drinks of alcohol     Comment: occ, 3 -4 per week            Family History:     Family History   Problem Relation Age of Onset    Stomach Cancer Maternal Grandmother     Breast Cancer Mother     Prostate Cancer Father             Allergies:   No Known Allergies         Medications:     Current Outpatient Medications   Medication    itraconazole (SPORANOX) 100 MG capsule     No current facility-administered medications for this visit.             Review of Systems:     A 10 point ROS was performed and reviewed. Specific responses to these questions are noted at the end of the document.         Physical Exam:     PHYSICAL EXAM:   Constitutional - Patient is healthy, well-nourished and appears stated age, is in no acute distress    Vitals: There were no vitals taken for this visit.   Respiratory - Patient is breathing normally, no audible wheeze or respiratory distress.   Skin - No suspicious rashes or lesions.   Psychiatric - Normal mood and affect.   Cardiovascular - Extremities warm and well  perfused.   GI - No abdominal distention.   Musculoskeletal - Non-antalgic gait without use of assistive devices.                  Cervical Spine:    Appearance -  normal appearing cervical lordosis,  no scars or lesions, no chin on chest deformity    GAIT: Tandem gait without assistive device or evidence of dysmetria. Is able to walk on tip toes and heels without difficulty.    Palpation - tender to palpation over paraspinal muscles, trapezius, base of occiput    ROM - range of motion is slightly decreased in cervical flexion and extension, lateral rotation, lateral bend    Motor -        Upper EXTREMITY Left Right   Shoulder Abduction 5/5 4+/5   Shoulder Flexion 5/5 5/5   Elbow Flexion 5/5 5/5   Elbow Extension 5/5 5/5   Wrist Extension 5/5 5/5   Wrist Flexion 5/5 5/5   Finger Adbuction 5/5 5/5        Special tests -     Spurling's - Negative     Axial Compression- Negative     Neurologic - Sensation intact to light touch bilaterally.      REFLEXES Left Right   Biceps 2+ 2+   Triceps 2+ 2+   Brachioradialis 2+ 2+   Pimentel's No No                Imaging:   We ordered and independently reviewed new radiographs at this clinic visit. The results were discussed with the patient.  Findings include:    Radiographs of cervical spine dated 07/05/2023 demonstrate multilevel cervical spondylosis with ventral osteophytosis from C4-C7 including very large bridging osteotphyte at C5-6.  Cervical lordosis is generally well-maintained without evidence of large derangement in C2-C7 SVA.      MRI of the cervical spine dated 01/02/2023 demonstrates multilevel cervical spinal stenosis including C3-4 (with cord effacement), c4-5 (severe cord effacement), C5-6 (with cord effacement), C6-7(slight left sided cord effacement). There is concomitant neuroforaminal stenosis at these levels. Of note he does have severe right sided C4-5 foraminal stenosis which can manifest as lateral shoulder pain very similar to rotator cuff or bursal  pathology.               Assessment and Plan:   Assessment:  57 year old male with:  1. Right shoulder impingement  2. Right cubital tunnel syndrome  3. Possible right C5 radiculopathy  4. Cervical spine degenerative changes with severe stenosis and cord effacement without presence of myelopathy       Plan:  Patient is set to undergo physical therapy for his right shoulder.  We advised that it would also be reasonable for him to participate in physical therapy for his neck.  Did discuss that he would likely be a candidate for a transforaminal nerve block particular at the right C5 level if therapy is not able to provide him with significant relief  Discussed that the patient does seem to be having symptoms of cubital tunnel syndrome.  Advised the patient on night splinting to see if this assists with his numbness and tingling to his right hand in the ulnar nerve distribution  We did discuss symptoms of myelopathy which are not present at the moment.  If the patient does begin to have more myelopathic type of symptoms, discussed that this would potentially push us more towards some sort of decompression and fusion in the future.  The patient was understanding of this as well.  Return to clinic in approximately 3 months for repeat evaluation following physical therapy and for discussion of other treatment modalities      Carmelo Cruz MD  Orthopaedic Surgery PGY-4    I reviewed the patient's history, physical examination and imaging studies with the Resident. In addition I individually examined the patient and developed the treatment plan.  I agree with the assessment and plan as documented.     Time spent on this clinical encounter including previsit chart review, history and physical examination, patient counseling and documentation was 45 minutes on the date of encounter.      Respectfully,  Matt Azul MD  Spine Surgery  [facility]

## 2023-07-05 NOTE — NURSING NOTE
Reason For Visit:   Chief Complaint   Patient presents with     Consult     Soft tissue sarcoma of upper extremity, right (H) [C49.11] Right arm numbness        Primary MD: Prieto Keller  Ref. MD: Dr. Alvarado    ?  No  Occupation [employer].  Currently working? Yes.  Work status?  Full time.    Date of injury: No  Type of injury: No.    Date of surgery: No  Type of surgery: No.    Smoker: No  Request smoking cessation information: No    There were no vitals taken for this visit.    Pain Assessment  Patient Currently in Pain: Yes  0-10 Pain Scale: 2  Primary Pain Location: Arm (right)    Oswestry (JESSICA) Questionnaire        7/3/2023    11:57 AM   OSWESTRY DISABILITY INDEX   Count 10   Sum 0   Oswestry Score (%) 0 %            Neck Disability Index (NDI) Questionnaire        7/3/2023    12:04 PM   Neck Disability Index (NDI)   Neck Disability Index: Count 10   NDI: Total Score = SUM (points for all 10 findings) 0   Neck Disability in Percent = (Total Score) / 50 * 100 0 (%)              Visual Analog Pain Scale  Back Pain Scale 0-10: 0  Right leg pain: 0  Left leg pain: 0  Neck Pain Scale 0-10: 0  Right arm pain: 2 (worse with use)  Left arm pain: 0    Promis 10 Assessment        7/3/2023    11:58 AM   PROMIS 10   In general, would you say your health is: Very good   In general, would you say your quality of life is: Excellent   In general, how would you rate your physical health? Very good   In general, how would you rate your mental health, including your mood and your ability to think? Excellent   In general, how would you rate your satisfaction with your social activities and relationships? Excellent   In general, please rate how well you carry out your usual social activities and roles Excellent   To what extent are you able to carry out your everyday physical activities such as walking, climbing stairs, carrying groceries, or moving a chair? Completely   In the past 7 days, how often have you been  bothered by emotional problems such as feeling anxious, depressed, or irritable? Never   In the past 7 days, how would you rate your fatigue on average? None   In the past 7 days, how would you rate your pain on average, where 0 means no pain, and 10 means worst imaginable pain? 0   In general, would you say your health is: 4   In general, would you say your quality of life is: 5   In general, how would you rate your physical health? 4   In general, how would you rate your mental health, including your mood and your ability to think? 5   In general, how would you rate your satisfaction with your social activities and relationships? 5   In general, please rate how well you carry out your usual social activities and roles. (This includes activities at home, at work and in your community, and responsibilities as a parent, child, spouse, employee, friend, etc.) 5   To what extent are you able to carry out your everyday physical activities such as walking, climbing stairs, carrying groceries, or moving a chair? 5   In the past 7 days, how often have you been bothered by emotional problems such as feeling anxious, depressed, or irritable? 1   In the past 7 days, how would you rate your fatigue on average? 1   In the past 7 days, how would you rate your pain on average, where 0 means no pain, and 10 means worst imaginable pain? 0   Global Mental Health Score 20   Global Physical Health Score 19   PROMIS TOTAL - SUBSCORES 39                Lara Blue LPN

## 2023-07-06 DIAGNOSIS — R91.1 LUNG NODULE: Primary | ICD-10-CM

## 2023-07-10 ENCOUNTER — LAB (OUTPATIENT)
Dept: LAB | Facility: CLINIC | Age: 58
End: 2023-07-10
Attending: INTERNAL MEDICINE
Payer: COMMERCIAL

## 2023-07-10 ENCOUNTER — OFFICE VISIT (OUTPATIENT)
Dept: PULMONOLOGY | Facility: CLINIC | Age: 58
End: 2023-07-10
Attending: INTERNAL MEDICINE
Payer: COMMERCIAL

## 2023-07-10 VITALS — OXYGEN SATURATION: 99 % | DIASTOLIC BLOOD PRESSURE: 83 MMHG | HEART RATE: 52 BPM | SYSTOLIC BLOOD PRESSURE: 131 MMHG

## 2023-07-10 DIAGNOSIS — B39.2 PULMONARY HISTOPLASMOSIS (H): ICD-10-CM

## 2023-07-10 DIAGNOSIS — B39.2 PULMONARY HISTOPLASMOSIS (H): Primary | ICD-10-CM

## 2023-07-10 DIAGNOSIS — R91.1 LUNG NODULE: ICD-10-CM

## 2023-07-10 LAB
ALBUMIN SERPL BCG-MCNC: 4.4 G/DL (ref 3.5–5.2)
ALP SERPL-CCNC: 67 U/L (ref 40–129)
ALT SERPL W P-5'-P-CCNC: 17 U/L (ref 0–70)
ANION GAP SERPL CALCULATED.3IONS-SCNC: 9 MMOL/L (ref 7–15)
AST SERPL W P-5'-P-CCNC: 27 U/L (ref 0–45)
BASOPHILS # BLD AUTO: 0 10E3/UL (ref 0–0.2)
BASOPHILS NFR BLD AUTO: 1 %
BILIRUB SERPL-MCNC: 0.7 MG/DL
BUN SERPL-MCNC: 17.9 MG/DL (ref 6–20)
CALCIUM SERPL-MCNC: 9.4 MG/DL (ref 8.6–10)
CHLORIDE SERPL-SCNC: 105 MMOL/L (ref 98–107)
CREAT SERPL-MCNC: 0.91 MG/DL (ref 0.67–1.17)
DEPRECATED HCO3 PLAS-SCNC: 26 MMOL/L (ref 22–29)
EOSINOPHIL # BLD AUTO: 0.2 10E3/UL (ref 0–0.7)
EOSINOPHIL NFR BLD AUTO: 4 %
ERYTHROCYTE [DISTWIDTH] IN BLOOD BY AUTOMATED COUNT: 13.2 % (ref 10–15)
GFR SERPL CREATININE-BSD FRML MDRD: >90 ML/MIN/1.73M2
GLUCOSE SERPL-MCNC: 97 MG/DL (ref 70–99)
HCT VFR BLD AUTO: 42.9 % (ref 40–53)
HGB BLD-MCNC: 14.4 G/DL (ref 13.3–17.7)
IMM GRANULOCYTES # BLD: 0 10E3/UL
IMM GRANULOCYTES NFR BLD: 0 %
LYMPHOCYTES # BLD AUTO: 1.3 10E3/UL (ref 0.8–5.3)
LYMPHOCYTES NFR BLD AUTO: 27 %
MCH RBC QN AUTO: 30.8 PG (ref 26.5–33)
MCHC RBC AUTO-ENTMCNC: 33.6 G/DL (ref 31.5–36.5)
MCV RBC AUTO: 92 FL (ref 78–100)
MONOCYTES # BLD AUTO: 0.5 10E3/UL (ref 0–1.3)
MONOCYTES NFR BLD AUTO: 11 %
NEUTROPHILS # BLD AUTO: 2.8 10E3/UL (ref 1.6–8.3)
NEUTROPHILS NFR BLD AUTO: 57 %
NRBC # BLD AUTO: 0 10E3/UL
NRBC BLD AUTO-RTO: 0 /100
PLATELET # BLD AUTO: 207 10E3/UL (ref 150–450)
POTASSIUM SERPL-SCNC: 4.3 MMOL/L (ref 3.4–5.3)
PROT SERPL-MCNC: 6.6 G/DL (ref 6.4–8.3)
RBC # BLD AUTO: 4.67 10E6/UL (ref 4.4–5.9)
SODIUM SERPL-SCNC: 140 MMOL/L (ref 136–145)
WBC # BLD AUTO: 4.9 10E3/UL (ref 4–11)

## 2023-07-10 PROCEDURE — 80053 COMPREHEN METABOLIC PANEL: CPT | Performed by: PATHOLOGY

## 2023-07-10 PROCEDURE — G0463 HOSPITAL OUTPT CLINIC VISIT: HCPCS | Performed by: INTERNAL MEDICINE

## 2023-07-10 PROCEDURE — 99000 SPECIMEN HANDLING OFFICE-LAB: CPT | Performed by: PATHOLOGY

## 2023-07-10 PROCEDURE — 80189 DRUG ASSAY ITRACONAZOLE: CPT | Performed by: INTERNAL MEDICINE

## 2023-07-10 PROCEDURE — 99214 OFFICE O/P EST MOD 30 MIN: CPT | Performed by: INTERNAL MEDICINE

## 2023-07-10 PROCEDURE — 36415 COLL VENOUS BLD VENIPUNCTURE: CPT | Performed by: PATHOLOGY

## 2023-07-10 PROCEDURE — 85025 COMPLETE CBC W/AUTO DIFF WBC: CPT | Performed by: PATHOLOGY

## 2023-07-10 RX ORDER — ITRACONAZOLE 100 MG/1
200 CAPSULE ORAL DAILY
Qty: 40 CAPSULE | Refills: 3 | Status: SHIPPED | OUTPATIENT
Start: 2023-07-10

## 2023-07-10 ASSESSMENT — ENCOUNTER SYMPTOMS
CHILLS: 0
HEMOPTYSIS: 0
HEADACHES: 0
DIZZINESS: 0
SPEECH CHANGE: 0
SHORTNESS OF BREATH: 0
WHEEZING: 0
WEAKNESS: 0
LOSS OF CONSCIOUSNESS: 0
COUGH: 0
FEVER: 0
SEIZURES: 0
WEIGHT LOSS: 0
TINGLING: 1
MEMORY LOSS: 0
TREMORS: 0
SPUTUM PRODUCTION: 0

## 2023-07-10 NOTE — PROGRESS NOTES
Pulmonology Clinic Follow up Visit       Alphonso Hicks MRN# [id number]   YOB: 1965 Age: 57 year old     Date of Visit: 7/10/2023    Reason for Visit: Follow-up pulmonary histoplasmosis          Assessment and Plan:     58 YO with history of sarcoma who was found to have a AMPARO pulmonary nodule with biopsy x 2 that did not reveal malignancy but CT guided biopsy with the presence of fungal infection- most consistent with Histoplasmosis.        ## Pulmonary histoplasmosis  No clear source.  He was started on itraconazole on 5/23/2023.  Labs on return visit today do not reveal any hepatic dysfunction.  Repeat CT today without change in nodules so we will continue with therapy.  -Continue itraconazole  -Itraconazole level today  -CMP on follow-up to ensure no hepatic dysfunction  -We will consider repeat CT at his follow-up visit      ## Paralyzed right hemidiaphragm  Unclear etiology, likely related to previous shoulder injury.  He is asymptomatic so no treatment is required at this time.        Return to clinic: 3 months with either myself or Dr. Ontiveros, whoever is available    I personally spent 30 minutes on the date of the encounter doing chart review, history and exam, documentation and further activities per the note.      Daljit Hameed M.D.  Pulmonary & Critical Care  Pager: Click Here to page          History of Present Illness / Interval History:       Last seen: 5/23/23 by Dr. Ontiveros    Alphonso Hicks is a 58 YO with a history of sarcoma of right elbow diagnosed in 2015 s/p resection x 2 followed by XRT, no findings of recurrence. Developed a  ? neck mass in 1-23, underwent CT imaging showing a AMPARO nodule, as well as a new markedly elevated hemidiaphragm.  Seen by Dr. Montgomery in clinic and discussed at the lung nodule conference.  Underwent bronchoscopy by Dr. Huffman with only scant cellular material obtained on biopsy.  Underwent CT guided biopsy in 4-21: presence of necrotic tissue with  viable tissue showing chronic inflammation, also presence of fungal infection most consistent with Histoplasmosis.    Denied any cough or chest pain.  Denies any known exposure to birds, chickens or bats. Did help a friend tear down a storage shed last Summer, denies and bird or bat droppings in shed.  Denies any water damage or mold exposure in basement.  No tobacco.    At his last visit he was started on itraconazole.  He remains asymptomatic and is able to exert himself such as installing a fence with his son this past weekend without any respiratory complaints.  He notes that he has stopped running due to a lack of interest, but has not had any respiratory difficulties.    He denies coughing or wheezing.  No fevers or night sweats.  No unexpected weight loss.          Review of Systems:     Review of Systems   Constitutional: Negative for chills, fever and weight loss.   Respiratory: Negative for cough, hemoptysis, sputum production, shortness of breath and wheezing.    Neurological: Positive for tingling. Negative for dizziness, tremors, speech change, seizures, loss of consciousness, weakness and headaches.   Psychiatric/Behavioral: Negative for memory loss.            Physical Examination:     /83   Pulse 52   SpO2 99%     Physical Exam  Vitals and nursing note reviewed.   Constitutional:       Appearance: Normal appearance.   Cardiovascular:      Rate and Rhythm: Normal rate and regular rhythm.      Heart sounds: No murmur heard.  Pulmonary:      Effort: Pulmonary effort is normal.      Breath sounds: Normal breath sounds. No wheezing, rhonchi or rales.   Musculoskeletal:      Right lower leg: No edema.      Left lower leg: No edema.   Skin:     General: Skin is warm and dry.   Neurological:      General: No focal deficit present.      Mental Status: He is alert and oriented to person, place, and time.       Fingernails without clubbing        Data:       Labs from 5/23/2023   139       Potassium 3.4  - 5.3 mmol/L 4.7     Chloride 98 - 107 mmol/L 104     Carbon Dioxide (CO2) 22 - 29 mmol/L 29     Anion Gap 7 - 15 mmol/L 6 Low      Urea Nitrogen 6.0 - 20.0 mg/dL 19.1     Creatinine 0.67 - 1.17 mg/dL 0.89     Calcium 8.6 - 10.0 mg/dL 9.6     Glucose 70 - 99 mg/dL 93     Alkaline Phosphatase 40 - 129 U/L 80     AST 10 - 50 U/L 21     ALT 10 - 50 U/L 15     Protein Total 6.4 - 8.3 g/dL 7.1     Albumin 3.5 - 5.2 g/dL 4.4     Bilirubin Total <=1.2 mg/dL 0.5     GFR Estimate >60 mL/min/1.73m2 >90        IgG 865    CT chest 6/20/2023  1.  Stable clustered pulmonary nodules in the left upper and right  lower lobes when compared to 3/21/2023.  2.  Stable elevated right hemidiaphragm.    All studies listed here were independently reviewed and interpreted by me today.         Medications:     Current Outpatient Medications   Medication     itraconazole (SPORANOX) 100 MG capsule     No current facility-administered medications for this visit.             The above note was dictated using voice recognition software and may include typographical errors. Please contact the author for any clarifications.

## 2023-07-10 NOTE — LETTER
7/10/2023         RE: Alphonso Hicks  [id number] [address] [facility] [address]        Dear Colleague,    Thank you for referring your patient, Alphonso Hicks, to the [facility] LUNG SCIENCE AND [facility]. Please see a copy of my visit note below.    Pulmonology Clinic Follow up Visit       Alphonso Hicks MRN# [id number]   YOB: 1965 Age: 57 year old     Date of Visit: 7/10/2023    Reason for Visit: Follow-up pulmonary histoplasmosis          Assessment and Plan:     56 YO with history of sarcoma who was found to have a AMPARO pulmonary nodule with biopsy x 2 that did not reveal malignancy but CT guided biopsy with the presence of fungal infection- most consistent with Histoplasmosis.        ## Pulmonary histoplasmosis  No clear source.  He was started on itraconazole on 5/23/2023.  Labs on return visit today do not reveal any hepatic dysfunction.  Repeat CT today without change in nodules so we will continue with therapy.  -Continue itraconazole  -Itraconazole level today  -CMP on follow-up to ensure no hepatic dysfunction  -We will consider repeat CT at his follow-up visit      ## Paralyzed right hemidiaphragm  Unclear etiology, likely related to previous shoulder injury.  He is asymptomatic so no treatment is required at this time.        Return to clinic: 3 months with either myself or Dr. Ontiveros, whoever is available    I personally spent 30 minutes on the date of the encounter doing chart review, history and exam, documentation and further activities per the note.      Daljit Hameed M.D.  Pulmonary & Critical Care  Pager: Click Here to page          History of Present Illness / Interval History:       Last seen: 5/23/23 by Dr. 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 Developed a  ? neck mass in 1-23, underwent CT imaging showing a AMPARO  nodule, as well as a new markedly elevated hemidiaphragm.  Seen by Dr. Montgomery in clinic and discussed at the lung nodule conference.  Underwent bronchoscopy by Dr. Huffman with only scant cellular material obtained on biopsy.  Underwent CT guided biopsy in 4-21: presence of necrotic tissue with viable tissue showing chronic inflammation, also presence of fungal infection most consistent with Histoplasmosis.    Denied any cough or chest pain.  Denies any known exposure to birds, chickens or bats. Did help a friend tear down a storage shed last Summer, denies and bird or bat droppings in shed.  Denies any water damage or mold exposure in basement.  No tobacco.    At his last visit he was started on itraconazole.  He remains asymptomatic and is able to exert himself such as installing a fence with his son this past weekend without any respiratory complaints.  He notes that he has stopped running due to a lack of interest, but has not had any respiratory difficulties.    He denies coughing or wheezing.  No fevers or night sweats.  No unexpected weight loss.          Review of Systems:     Review of Systems   Constitutional: Negative for chills, fever and weight loss.   Respiratory: Negative for cough, hemoptysis, sputum production, shortness of breath and wheezing.    Neurological: Positive for tingling. Negative for dizziness, tremors, speech change, seizures, loss of consciousness, weakness and headaches.   Psychiatric/Behavioral: Negative for memory loss.            Physical Examination:     /83   Pulse 52   SpO2 99%     Physical Exam  Vitals and nursing note reviewed.   Constitutional:       Appearance: Normal appearance.   Cardiovascular:      Rate and Rhythm: Normal rate and regular rhythm.      Heart sounds: No murmur heard.  Pulmonary:      Effort: Pulmonary effort is normal.      Breath sounds: Normal breath sounds. No wheezing, rhonchi or rales.   Musculoskeletal:      Right lower leg: No edema.      Left  lower leg: No edema.   Skin:     General: Skin is warm and dry.   Neurological:      General: No focal deficit present.      Mental Status: He is alert and oriented to person, place, and time.       Fingernails without clubbing        Data:       Labs from 5/23/2023   139       Potassium 3.4 - 5.3 mmol/L 4.7     Chloride 98 - 107 mmol/L 104     Carbon Dioxide (CO2) 22 - 29 mmol/L 29     Anion Gap 7 - 15 mmol/L 6 Low      Urea Nitrogen 6.0 - 20.0 mg/dL 19.1     Creatinine 0.67 - 1.17 mg/dL 0.89     Calcium 8.6 - 10.0 mg/dL 9.6     Glucose 70 - 99 mg/dL 93     Alkaline Phosphatase 40 - 129 U/L 80     AST 10 - 50 U/L 21     ALT 10 - 50 U/L 15     Protein Total 6.4 - 8.3 g/dL 7.1     Albumin 3.5 - 5.2 g/dL 4.4     Bilirubin Total <=1.2 mg/dL 0.5     GFR Estimate >60 mL/min/1.73m2 >90        IgG 865    CT chest 6/20/2023  1.  Stable clustered pulmonary nodules in the left upper and right  lower lobes when compared to 3/21/2023.  2.  Stable elevated right hemidiaphragm.    All studies listed here were independently reviewed and interpreted by me today.         Medications:     Current Outpatient Medications   Medication     itraconazole (SPORANOX) 100 MG capsule     No current facility-administered medications for this visit.             The above note was dictated using voice recognition software and may include typographical errors. Please contact the author for any clarifications.        Again, thank you for allowing me to participate in the care of your patient.        Sincerely,        Daljit Hameed MD

## 2023-07-10 NOTE — NURSING NOTE
Chief Complaint   Patient presents with     General Visit     F/u     Vitals were taken and medications were reconciled.     NAVNEET Sharp

## 2023-07-11 LAB
ITRACONAZ SERPL-MCNC: 0.5 UG/ML (ref 0.5–5)
ITRACONAZ+HIT SERPL-MCNC: 1.3 UG/ML
OH-ITRACONAZ SERPL-MCNC: 0.8 UG/ML

## 2023-07-29 NOTE — TELEPHONE ENCOUNTER
Patient Contacted for the patient to call back and schedule the following:    Appointment type: FPFT  Provider: Zari  Return date: 5/24/23  Specialty phone number: na  Additional appointment(s) needed: na  Additonal Notes: 5/10/23 - Spoke to pt; scheduled FPFT prior to visit with Dr. Hameed on 5/24/23, per nurse's note. [facility]   Subjective: Patient states he is doing good. Falls since last visit No(if yes complete the Fall Tracking Form and include bsrifallreport):   Caregiver involvement changes: N/a  Home health supplies by type and quantity ordered/delivered this visit include: N/A    Clinician asked if patient has had any physician contact since last home care visit and patient states: NO  Clinician asked if patient has any new or changed medications and patient states:  N/A   If Yes, were medications reconciled? N/A   Was the certifying physician notified of changes in medications? N/A no    Clinical assessment (what this visit means for the patient overall and need for ongoing skilled care) and progress or lack of progress towards SPECIFIC goals: Patient has progressed and met all goals. Is excited to start redoing his bathroom upstairs. Vitals stable. No changes in medictions- able to teach back each medication he takes, and why. Still sometimes has constipation but has been better lately. Asked to be discharged. Does not feel the need for continued care. Discharged from [facility]. Written Teaching Material Utilized: N/A    Interdisciplinary communication with: N/A    Discharge planning as follows: discharged.

## 2023-10-04 ENCOUNTER — OFFICE VISIT (OUTPATIENT)
Dept: PULMONOLOGY | Facility: CLINIC | Age: 58
End: 2023-10-04
Attending: INTERNAL MEDICINE
Payer: COMMERCIAL

## 2023-10-04 ENCOUNTER — LAB (OUTPATIENT)
Dept: LAB | Facility: CLINIC | Age: 58
End: 2023-10-04
Attending: INTERNAL MEDICINE
Payer: COMMERCIAL

## 2023-10-04 ENCOUNTER — OFFICE VISIT (OUTPATIENT)
Dept: ORTHOPEDICS | Facility: CLINIC | Age: 58
End: 2023-10-04
Payer: COMMERCIAL

## 2023-10-04 VITALS
DIASTOLIC BLOOD PRESSURE: 79 MMHG | HEART RATE: 62 BPM | HEIGHT: 69 IN | RESPIRATION RATE: 18 BRPM | BODY MASS INDEX: 25.18 KG/M2 | SYSTOLIC BLOOD PRESSURE: 119 MMHG | OXYGEN SATURATION: 96 % | WEIGHT: 170 LBS

## 2023-10-04 DIAGNOSIS — M48.02 CERVICAL STENOSIS OF SPINE: ICD-10-CM

## 2023-10-04 DIAGNOSIS — R20.0 RIGHT ARM NUMBNESS: ICD-10-CM

## 2023-10-04 DIAGNOSIS — M54.12 CERVICAL RADICULOPATHY: Primary | ICD-10-CM

## 2023-10-04 DIAGNOSIS — B39.2 PULMONARY HISTOPLASMOSIS (H): Primary | ICD-10-CM

## 2023-10-04 DIAGNOSIS — R29.898 RIGHT ARM WEAKNESS: ICD-10-CM

## 2023-10-04 DIAGNOSIS — B39.2 PULMONARY HISTOPLASMOSIS (H): ICD-10-CM

## 2023-10-04 LAB
ALBUMIN SERPL BCG-MCNC: 4.4 G/DL (ref 3.5–5.2)
ALP SERPL-CCNC: 76 U/L (ref 40–129)
ALT SERPL W P-5'-P-CCNC: 16 U/L (ref 0–70)
ANION GAP SERPL CALCULATED.3IONS-SCNC: 9 MMOL/L (ref 7–15)
AST SERPL W P-5'-P-CCNC: 17 U/L (ref 0–45)
BILIRUB SERPL-MCNC: 0.7 MG/DL
BUN SERPL-MCNC: 17.4 MG/DL (ref 6–20)
CALCIUM SERPL-MCNC: 9.5 MG/DL (ref 8.6–10)
CHLORIDE SERPL-SCNC: 103 MMOL/L (ref 98–107)
CREAT SERPL-MCNC: 0.92 MG/DL (ref 0.67–1.17)
DEPRECATED HCO3 PLAS-SCNC: 27 MMOL/L (ref 22–29)
EGFRCR SERPLBLD CKD-EPI 2021: >90 ML/MIN/1.73M2
GLUCOSE SERPL-MCNC: 109 MG/DL (ref 70–99)
POTASSIUM SERPL-SCNC: 4.2 MMOL/L (ref 3.4–5.3)
PROT SERPL-MCNC: 6.7 G/DL (ref 6.4–8.3)
SODIUM SERPL-SCNC: 139 MMOL/L (ref 135–145)

## 2023-10-04 PROCEDURE — 99214 OFFICE O/P EST MOD 30 MIN: CPT | Performed by: ORTHOPAEDIC SURGERY

## 2023-10-04 PROCEDURE — 99214 OFFICE O/P EST MOD 30 MIN: CPT | Performed by: INTERNAL MEDICINE

## 2023-10-04 PROCEDURE — 99213 OFFICE O/P EST LOW 20 MIN: CPT | Performed by: INTERNAL MEDICINE

## 2023-10-04 PROCEDURE — 80053 COMPREHEN METABOLIC PANEL: CPT | Performed by: PATHOLOGY

## 2023-10-04 PROCEDURE — 36415 COLL VENOUS BLD VENIPUNCTURE: CPT | Performed by: PATHOLOGY

## 2023-10-04 RX ORDER — GABAPENTIN 300 MG/1
300 CAPSULE ORAL 3 TIMES DAILY
Qty: 90 CAPSULE | Refills: 3 | Status: SHIPPED | OUTPATIENT
Start: 2023-10-04

## 2023-10-04 ASSESSMENT — ENCOUNTER SYMPTOMS
WHEEZING: 0
SHORTNESS OF BREATH: 0
HEMOPTYSIS: 0
COUGH: 0
CHILLS: 0
SPUTUM PRODUCTION: 0
FEVER: 0
WEIGHT LOSS: 0

## 2023-10-04 ASSESSMENT — PAIN SCALES - GENERAL: PAINLEVEL: NO PAIN (0)

## 2023-10-04 NOTE — NURSING NOTE
Chief Complaint   Patient presents with    RECHECK     3 month recheck     Buddy Chris, CMA CMA at 6:51 AM on 10/4/2023      Subjective:    Patient ID:  Yonathan Hernandez Sr. is a 72 y.o. male who presents for PAF (paroxysmal atrial fibrillation), Hypertension, and Risk Factor Management For Atherosclerosis        HPI  LABS FROM SEP K 5.2, HB 17.5, , DOING WELL,L KNEE PAIN, TO SEE ORTHO, NOT COMPLIANT WITH PRAVACHOL, DECREASED K INTAKE,  SEE ROS    Past Medical History:   Diagnosis Date    Atrial fibrillation     Edema     Enlarged prostate      Past Surgical History:   Procedure Laterality Date    ANGIOGRAM, CORONARY, WITH LEFT HEART CATHETERIZATION  10/1/2019    Procedure: Angiogram, Coronary, with Left Heart Cath;  Surgeon: Mica Tafoya MD;  Location: [facility] CATH;  Service: Cardiology;;    CARDIAC CATHETERIZATION       Family History   Problem Relation Age of Onset    Lung cancer Father      Social History     Socioeconomic History    Marital status:    Tobacco Use    Smoking status: Never Smoker    Smokeless tobacco: Never Used   Substance and Sexual Activity    Alcohol use: Not Currently    Drug use: Not Currently       Review of patient's allergies indicates:   Allergen Reactions    Lidocaine hcl        Current Outpatient Medications:     furosemide (LASIX) 20 MG tablet, Take 20 mg by mouth once daily., Disp: , Rfl:     furosemide (LASIX) 40 MG tablet, TAKE 1 TABLET(40 MG) BY MOUTH EVERY DAY, Disp: 90 tablet, Rfl: 0    potassium chloride SA (K-DUR,KLOR-CON) 10 MEQ tablet, TAKE 1 TABLET BY MOUTH EVERY OTHER DAY, Disp: 90 tablet, Rfl: 1    sotaloL (BETAPACE) 120 MG Tab, TAKE 1 TABLET BY MOUTH EVERY 12 HOURS, Disp: 180 tablet, Rfl: 0    tamsulosin (FLOMAX) 0.4 mg Cp24, TK 1 C PO QD, Disp: , Rfl: 1    XARELTO 20 mg Tab, TAKE 1 TABLET BY MOUTH EVERY DAY EVENING MEAL WITH DINNER, Disp: 90 tablet, Rfl: 0    pravastatin (PRAVACHOL) 10 MG tablet, Take 1 tablet (10 mg total) by mouth once daily., Disp: 90 tablet, Rfl: 1    spironolactone (ALDACTONE) 25 MG tablet, Take 0.5 tablets (12.5 mg total) by mouth once  "daily., Disp: 45 tablet, Rfl: 0    Review of Systems   Constitutional: Negative for chills, diaphoresis, fever, malaise/fatigue, night sweats and weight loss.   HENT: Negative for congestion and hoarse voice.    Eyes: Negative for blurred vision and visual disturbance.   Cardiovascular: Negative for chest pain, claudication, cyanosis, dyspnea on exertion (MINIMAL), irregular heartbeat, leg swelling (MILD/L), near-syncope, orthopnea, palpitations, paroxysmal nocturnal dyspnea and syncope.   Respiratory: Negative for cough, hemoptysis, shortness of breath and wheezing.    Hematologic/Lymphatic: Negative for adenopathy. Does not bruise/bleed easily.   Skin: Negative for color change and rash.   Musculoskeletal: Positive for joint pain (KNEE). Negative for back pain, falls and joint swelling.   Gastrointestinal: Negative for abdominal pain, change in bowel habit, dysphagia, heartburn, jaundice, melena and nausea.   Genitourinary: Negative for dysuria and flank pain.   Neurological: Negative for brief paralysis, focal weakness, headaches, light-headedness, loss of balance, numbness, paresthesias and weakness.   Psychiatric/Behavioral: Negative for altered mental status, depression and memory loss.   Allergic/Immunologic: Negative.         Objective:      Vitals:    06/02/22 1020   BP: 133/78   Pulse: 70   Weight: 125.3 kg (276 lb 3.8 oz)   Height: 5' 11" (1.803 m)   PainSc: 0-No pain     Body mass index is 38.53 kg/m².    Physical Exam  Constitutional:       Appearance: He is well-developed. He is obese.   HENT:      Head: Normocephalic and atraumatic.   Eyes:      General: No scleral icterus.     Extraocular Movements: Extraocular movements intact.   Neck:      Vascular: Normal carotid pulses. No carotid bruit or JVD.   Cardiovascular:      Rate and Rhythm: Normal rate and regular rhythm.  No extrasystoles are present.     Pulses:           Carotid pulses are 2+ on the right side and 2+ on the left side.       Radial " pulses are 2+ on the right side and 2+ on the left side.        Femoral pulses are 2+ on the right side and 2+ on the left side.       Posterior tibial pulses are 2+ on the right side and 2+ on the left side.      Heart sounds: Murmur heard.    Systolic murmur is present with a grade of 1/6 at the lower left sternal border and apex.    No gallop.   Pulmonary:      Effort: Pulmonary effort is normal.      Breath sounds: Normal breath sounds. No rales.   Abdominal:      Palpations: Abdomen is soft. There is no hepatomegaly.      Tenderness: There is no abdominal tenderness.   Musculoskeletal:         General: Normal range of motion.      Cervical back: Neck supple.      Right lower leg: Edema present.      Left lower leg: Edema present.   Skin:     General: Skin is warm and dry.      Capillary Refill: Capillary refill takes less than 2 seconds.   Neurological:      General: No focal deficit present.      Mental Status: He is alert and oriented to person, place, and time.      Cranial Nerves: Cranial nerves are intact. No cranial nerve deficit.   Psychiatric:         Mood and Affect: Mood normal.         Speech: Speech normal.         Behavior: Behavior normal.                 ..    Chemistry        Component Value Date/Time     (L) 06/02/2022 1100    K 4.5 06/02/2022 1100    CL 99 06/02/2022 1100    CO2 30 (H) 06/02/2022 1100    BUN 15 06/02/2022 1100    CREATININE 0.8 06/02/2022 1100    GLU 80 06/02/2022 1100        Component Value Date/Time    CALCIUM 8.9 06/02/2022 1100    ALKPHOS 54 (L) 06/02/2022 1100    AST 24 06/02/2022 1100    ALT 26 06/02/2022 1100    BILITOT 0.6 06/02/2022 1100    ESTGFRAFRICA >60.0 06/02/2022 1100    EGFRNONAA >60.0 06/02/2022 1100            ..  Lab Results   Component Value Date    CHOL 137 12/02/2020     Lab Results   Component Value Date    HDL 39 (L) 12/02/2020     Lab Results   Component Value Date    LDLCALC 64.2 12/02/2020     Lab Results   Component Value Date    TRIG 169 (H)  12/02/2020     Lab Results   Component Value Date    CHOLHDL 28.5 12/02/2020     ..  Lab Results   Component Value Date    WBC 6.71 06/02/2022    HGB 16.9 06/02/2022    HCT 51.7 06/02/2022    MCV 91 06/02/2022     06/02/2022       Test(s) Reviewed  I have reviewed the following in detail:  [] Stress test   [] Angiography   [] Echocardiogram   [x] Labs   [] Other:       Assessment:         ICD-10-CM ICD-9-CM   1. PAF (paroxysmal atrial fibrillation)  I48.0 427.31   2. Non-rheumatic mitral regurgitation  I34.0 424.0   3. Mild CAD  I25.10 414.00   4. Long term current use of antiarrhythmic drug  Z79.899 V58.69   5. Essential hypertension  I10 401.9   6. Hypercholesteremia  E78.00 272.0   7. Elevated left ventricular end-diastolic pressure (LVEDP)  R94.30 794.30   8. Long term (current) use of anticoagulants  Z79.01 V58.61   9. Severe obesity (BMI 35.0-39.9) with comorbidity  E66.01 278.01     Problem List Items Addressed This Visit        Cardiac/Vascular    PAF (paroxysmal atrial fibrillation) - Primary    Relevant Orders    Comprehensive Metabolic Panel (Completed)    Long term current use of antiarrhythmic drug    Relevant Orders    Comprehensive Metabolic Panel (Completed)    Essential hypertension    Relevant Orders    Comprehensive Metabolic Panel (Completed)    Non-rheumatic mitral regurgitation    Elevated left ventricular end-diastolic pressure (LVEDP)    Mild CAD    Relevant Orders    Comprehensive Metabolic Panel (Completed)    Hypercholesteremia    Relevant Orders    Comprehensive Metabolic Panel (Completed)       Hematology    Long term (current) use of anticoagulants    Relevant Orders    CBC Auto Differential (Completed)       Endocrine    Severe obesity (BMI 35.0-39.9) with comorbidity           Plan:         LABS NOW, COMPLIANCE WITH PRAVACHOL, ALL CV CLINICALLY STABLE, NO ANGINA, NO HF, NO TIA, NO CLINICAL ARRHYTHMIA,CONTINUE CURRENT MEDS, EDUCATION, DIET, EXERCISE, WEIGHT LOSS RETURN TO CLINIC  IN 7-8 MO  PAF (paroxysmal atrial fibrillation)  -     Comprehensive Metabolic Panel; Future; Expected date: 06/02/2022    Non-rheumatic mitral regurgitation    Mild CAD  -     Comprehensive Metabolic Panel; Future; Expected date: 06/02/2022    Long term current use of antiarrhythmic drug  -     Comprehensive Metabolic Panel; Future; Expected date: 06/02/2022    Essential hypertension  Comments:  CONTROLLED  Orders:  -     Comprehensive Metabolic Panel; Future; Expected date: 06/02/2022    Hypercholesteremia  Comments:  DIET AND COMPLIANCE WITH MED  Orders:  -     Comprehensive Metabolic Panel; Future; Expected date: 06/02/2022    Elevated left ventricular end-diastolic pressure (LVEDP)    Long term (current) use of anticoagulants  -     CBC Auto Differential; Future; Expected date: 06/02/2022    Severe obesity (BMI 35.0-39.9) with comorbidity    Other orders  -     pravastatin (PRAVACHOL) 10 MG tablet; Take 1 tablet (10 mg total) by mouth once daily.  Dispense: 90 tablet; Refill: 1    RTC Low level/low impact aerobic exercise 5x's/wk. Heart healthy diet and risk factor modification.    See labs and med orders.    Aerobic exercise 5x's/wk. Heart healthy diet and risk factor modification.    See labs and med orders.

## 2023-10-04 NOTE — PROGRESS NOTES
"Pulmonology Clinic Follow up Visit       Alphonso Hicks MRN# [id number]   YOB: 1965 Age: 57 year old     Date of Visit: 10/4/2023    Reason for Visit: Follow-up pulmonary histoplasmosis          Assessment and Plan:     ## Pulmonary histoplasmosis  No clear source.  He was started on itraconazole on 5/23/2023.  Labs on return visit today do not reveal any hepatic dysfunction.  Repeat CT today with decreased size of opacity.  -Discontinue itraconazole  -CT chest today to establish new baseline        ## Paralyzed right hemidiaphragm  Unclear etiology, likely related to previous shoulder injury.  He is asymptomatic so no treatment is required at this time.        Return to clinic: Video visit in 3 months          Daljit Hameed M.D.  Pulmonary & Critical Care  Pager: Click Here to page          History of Present Illness / Interval History:     56 YO with history of sarcoma who was found to have a AMPARO pulmonary nodule with biopsy x 2 that did not reveal malignancy but CT guided biopsy with the presence of fungal infection- most consistent with Histoplasmosis.      Last seen: 7/10/2023    Overall he feels well without fevers, rigors, malaise, fatigue, or weight loss.  He feels that his exercise tolerance is might be slightly reduced as he has not been running as frequently but plans to pick this up again over the winter.    However, he remains quite active without dyspnea.  He has done quite a bit of hiking this summer without any dyspnea or other respiratory complaints.          Review of Systems:     Review of Systems   Constitutional:  Negative for chills, fever and weight loss.   Respiratory:  Negative for cough, hemoptysis, sputum production, shortness of breath and wheezing.             Physical Examination:     /79   Pulse 62   Resp 18   Ht 1.753 m (5' 9\")   Wt 77.1 kg (170 lb)   SpO2 96%   BMI 25.10 kg/m      Physical Exam  Vitals and nursing note reviewed.   Constitutional:  "      Appearance: Normal appearance.   Cardiovascular:      Rate and Rhythm: Normal rate and regular rhythm.      Heart sounds: No murmur heard.  Pulmonary:      Effort: Pulmonary effort is normal.      Breath sounds: No wheezing or rhonchi.      Comments: Diminished on the right base consistent with paralyzed hemidiaphragm  Neurological:      Mental Status: He is alert.       Fingernails without clubbing        Data:      Latest Reference Range & Units 10/04/23 06:37   Sodium 135 - 145 mmol/L 139   Potassium 3.4 - 5.3 mmol/L 4.2   Chloride 98 - 107 mmol/L 103   Carbon Dioxide (CO2) 22 - 29 mmol/L 27   Urea Nitrogen 6.0 - 20.0 mg/dL 17.4   Creatinine 0.67 - 1.17 mg/dL 0.92   GFR Estimate >60 mL/min/1.73m2 >90   Calcium 8.6 - 10.0 mg/dL 9.5   Anion Gap 7 - 15 mmol/L 9   Albumin 3.5 - 5.2 g/dL 4.4   Protein Total 6.4 - 8.3 g/dL 6.7   Alkaline Phosphatase 40 - 129 U/L 76   ALT 0 - 70 U/L 16   AST 0 - 45 U/L 17   Bilirubin Total <=1.2 mg/dL 0.7   Glucose 70 - 99 mg/dL 109 (H)   (H): Data is abnormally high      CT chest 10/4/2023  Decreased size of a 13 by 8 mm cavitary nodule in the left upper lobe  which previously measured 15 x 10 mm without cavitation on 6/20/2023,  consistent with history of pulmonary histoplasmosis. Similar adjacent  clustered nodularity which suggests ongoing infectious process.  Additional 17 mm irregular nodule in the right lower lobe, which is  not significantly changed compared to the prior. No definite new  lesions.      All studies listed here were independently reviewed and interpreted by me today.         Medications:     Current Outpatient Medications   Medication    itraconazole (SPORANOX) 100 MG capsule     No current facility-administered medications for this visit.             The above note was dictated using voice recognition software and may include typographical errors. Please contact the author for any clarifications.    Answers submitted by the patient for this visit:  Symptoms  you have experienced in the last 30 days (Submitted on 9/28/2023)  General Symptoms: No  Skin Symptoms: No  HENT Symptoms: No  EYE SYMPTOMS: No  HEART SYMPTOMS: No  LUNG SYMPTOMS: No  INTESTINAL SYMPTOMS: No  URINARY SYMPTOMS: No  REPRODUCTIVE SYMPTOMS: No  SKELETAL SYMPTOMS: No  BLOOD SYMPTOMS: No  NERVOUS SYSTEM SYMPTOMS: No  MENTAL HEALTH SYMPTOMS: No

## 2023-10-04 NOTE — LETTER
10/4/2023         RE: Alphonso Hicks  [id number] [facility] [address]        Dear Colleague,    Thank you for referring your patient, Alphonso Hicks, to the [facility] FOR LUNG SCIENCE AND [facility]IN[facility] [place]. Please see a copy of my visit note below.    Pulmonology Clinic Follow up Visit       Alphonso Hicks MRN# [id number]   YOB: 1965 Age: 57 year old     Date of Visit: 10/4/2023    Reason for Visit: Follow-up pulmonary histoplasmosis          Assessment and Plan:     ## Pulmonary histoplasmosis  No clear source.  He was started on itraconazole on 5/23/2023.  Labs on return visit today do not reveal any hepatic dysfunction.  Repeat CT today with decreased size of opacity.  -Discontinue itraconazole  -CT chest today to establish new baseline        ## Paralyzed right hemidiaphragm  Unclear etiology, likely related to previous shoulder injury.  He is asymptomatic so no treatment is required at this time.        Return to clinic: Video visit in 3 months          Daljit Hameed M.D.  Pulmonary & Critical Care  Pager: Click Here to page          History of Present Illness / Interval History:     56 YO with history of sarcoma who was found to have a AMPARO pulmonary nodule with biopsy x 2 that did not reveal malignancy but CT guided biopsy with the presence of fungal infection- most consistent with Histoplasmosis.      Last seen: 7/10/2023    Overall he feels well without fevers, rigors, malaise, fatigue, or weight loss.  He feels that his exercise tolerance is might be slightly reduced as he has not been running as frequently but plans to pick this up again over the winter.    However, he remains quite active without dyspnea.  He has done quite a bit of hiking this summer without any dyspnea or other respiratory complaints.          Review of Systems:     Review of Systems   Constitutional:  Negative for chills, fever and weight loss.   Respiratory:  Negative for  "cough, hemoptysis, sputum production, shortness of breath and wheezing.             Physical Examination:     /79   Pulse 62   Resp 18   Ht 1.753 m (5' 9\")   Wt 77.1 kg (170 lb)   SpO2 96%   BMI 25.10 kg/m      Physical Exam  Vitals and nursing note reviewed.   Constitutional:       Appearance: Normal appearance.   Cardiovascular:      Rate and Rhythm: Normal rate and regular rhythm.      Heart sounds: No murmur heard.  Pulmonary:      Effort: Pulmonary effort is normal.      Breath sounds: No wheezing or rhonchi.      Comments: Diminished on the right base consistent with paralyzed hemidiaphragm  Neurological:      Mental Status: He is alert.       Fingernails without clubbing        Data:      Latest Reference Range & Units 10/04/23 06:37   Sodium 135 - 145 mmol/L 139   Potassium 3.4 - 5.3 mmol/L 4.2   Chloride 98 - 107 mmol/L 103   Carbon Dioxide (CO2) 22 - 29 mmol/L 27   Urea Nitrogen 6.0 - 20.0 mg/dL 17.4   Creatinine 0.67 - 1.17 mg/dL 0.92   GFR Estimate >60 mL/min/1.73m2 >90   Calcium 8.6 - 10.0 mg/dL 9.5   Anion Gap 7 - 15 mmol/L 9   Albumin 3.5 - 5.2 g/dL 4.4   Protein Total 6.4 - 8.3 g/dL 6.7   Alkaline Phosphatase 40 - 129 U/L 76   ALT 0 - 70 U/L 16   AST 0 - 45 U/L 17   Bilirubin Total <=1.2 mg/dL 0.7   Glucose 70 - 99 mg/dL 109 (H)   (H): Data is abnormally high      CT chest 10/4/2023  Decreased size of a 13 by 8 mm cavitary nodule in the left upper lobe  which previously measured 15 x 10 mm without cavitation on 6/20/2023,  consistent with history of pulmonary histoplasmosis. Similar adjacent  clustered nodularity which suggests ongoing infectious process.  Additional 17 mm irregular nodule in the right lower lobe, which is  not significantly changed compared to the prior. No definite new  lesions.      All studies listed here were independently reviewed and interpreted by me today.         Medications:     Current Outpatient Medications   Medication     itraconazole (SPORANOX) 100 MG " capsule     No current facility-administered medications for this visit.             The above note was dictated using voice recognition software and may include typographical errors. Please contact the author for any clarifications.    Answers submitted by the patient for this visit:  Symptoms you have experienced in the last 30 days (Submitted on 9/28/2023)  General Symptoms: No  Skin Symptoms: No  HENT Symptoms: No  EYE SYMPTOMS: No  HEART SYMPTOMS: No  LUNG SYMPTOMS: No  INTESTINAL SYMPTOMS: No  URINARY SYMPTOMS: No  REPRODUCTIVE SYMPTOMS: No  SKELETAL SYMPTOMS: No  BLOOD SYMPTOMS: No  NERVOUS SYSTEM SYMPTOMS: No  MENTAL HEALTH SYMPTOMS: No      Again, thank you for allowing me to participate in the care of your patient.        Sincerely,        Daljit Hameed MD

## 2023-10-04 NOTE — LETTER
10/4/2023         RE: Alphonso Hicks  [id number] [facility] [address]        Dear Colleague,    Thank you for referring your patient, Alphonso Hicks, to the [facility] [place]. Please see a copy of my visit note below.    Chief concern: Follow-up cervical radiculopathy    Patient is a very pleasant 57-year-old male whom I follow-up with regarding cervical radiculopathy affecting the right upper extremity.  Since our last visit he reports intermittent pain and paresthesias right upper extremity and C7 and T1 distributions.  She does feel some upper extremity weakness on the right side which is his dominant limb.  Denies myelopathic symptoms.    On exam today alert and oriented no acute stress    Respiration with notable wheeze  Range of motion is 40 degrees of extension 35 degrees of flexion 7 degrees left and right lateral rotation  Resisted strength testing notable for 4/5 strength with right tricep otherwise 5/5 bilateral upper extremities  Endorses subjective decrease sensation in the right T1 distribution otherwise intact by light touch  No hyperreflexia, no Pimentel's    Impression plan:  57-year-old male cervical spondylosis, foraminal stenosis without myelopathic symptoms.  Again discussed treatment options including medications, injections, surgery.  At this point his symptoms are more of a nuisance and are not severely disabling.  I recommended trying gabapentin which he was in agreement with.  Provided a prescription counseled the patient on safe dose escalation.  Given the option of scheduling follow-up versus following up as needed patient like to follow-up on a as needed basis    Time spent in this encounter today including chart review history physical examination care coordination documentation was 30 minutes      Matt Azul MD  , Department of Orthopedic Surgery  [facility]  Answers submitted by  the patient for this visit:  Symptoms you have experienced in the last 30 days (Submitted on 9/28/2023)  General Symptoms: No  Skin Symptoms: No  HENT Symptoms: No  EYE SYMPTOMS: No  HEART SYMPTOMS: No  LUNG SYMPTOMS: No  INTESTINAL SYMPTOMS: No  URINARY SYMPTOMS: No  REPRODUCTIVE SYMPTOMS: No  SKELETAL SYMPTOMS: No  BLOOD SYMPTOMS: No  NERVOUS SYSTEM SYMPTOMS: No  MENTAL HEALTH SYMPTOMS: No

## 2023-10-04 NOTE — PROGRESS NOTES
Chief concern: Follow-up cervical radiculopathy    Patient is a very pleasant 57-year-old male whom I follow-up with regarding cervical radiculopathy affecting the right upper extremity.  Since our last visit he reports intermittent pain and paresthesias right upper extremity and C7 and T1 distributions.  She does feel some upper extremity weakness on the right side which is his dominant limb.  Denies myelopathic symptoms.    On exam today alert and oriented no acute stress    Respiration with notable wheeze  Range of motion is 40 degrees of extension 35 degrees of flexion 7 degrees left and right lateral rotation  Resisted strength testing notable for 4/5 strength with right tricep otherwise 5/5 bilateral upper extremities  Endorses subjective decrease sensation in the right T1 distribution otherwise intact by light touch  No hyperreflexia, no Pimentel's    Impression plan:  57-year-old male cervical spondylosis, foraminal stenosis without myelopathic symptoms.  Again discussed treatment options including medications, injections, surgery.  At this point his symptoms are more of a nuisance and are not severely disabling.  I recommended trying gabapentin which he was in agreement with.  Provided a prescription counseled the patient on safe dose escalation.  Given the option of scheduling follow-up versus following up as needed patient like to follow-up on a as needed basis    Time spent in this encounter today including chart review history physical examination care coordination documentation was 30 minutes      Matt Azul MD  , Department of Orthopedic Surgery  [facility]  Answers submitted by the patient for this visit:  Symptoms you have experienced in the last 30 days (Submitted on 9/28/2023)  General Symptoms: No  Skin Symptoms: No  HENT Symptoms: No  EYE SYMPTOMS: No  HEART SYMPTOMS: No  LUNG SYMPTOMS: No  INTESTINAL SYMPTOMS: No  URINARY SYMPTOMS:  No  REPRODUCTIVE SYMPTOMS: No  SKELETAL SYMPTOMS: No  BLOOD SYMPTOMS: No  NERVOUS SYSTEM SYMPTOMS: No  MENTAL HEALTH SYMPTOMS: No

## 2023-10-04 NOTE — NURSING NOTE
Reason For Visit:   Chief Complaint   Patient presents with    RECHECK     3 month follow up neck, right arm pain       Primary MD: Prieto Keller  Ref. MD: EST    ?  No  Occupation [employer].  Currently working? Yes.  Work status?  Full time.     Date of injury: No  Type of injury: No.     Date of surgery: No  Type of surgery: No.     Smoker: No  Request smoking cessation information: No    There were no vitals taken for this visit.    Pain Assessment  Patient Currently in Pain: Yes  0-10 Pain Scale: 2  Primary Pain Location: Neck    Oswestry (JESSICA) Questionnaire        7/3/2023    11:57 AM   OSWESTRY DISABILITY INDEX   Count 10   Sum 0   Oswestry Score (%) 0 %            Neck Disability Index (NDI) Questionnaire        10/2/2023    11:27 AM   Neck Disability Index (NDI)   Neck Disability Index: Count 10   NDI: Total Score = SUM (points for all 10 findings) 6   Neck Disability in Percent = (Total Score) / 50 * 100 12 (%)              Visual Analog Pain Scale  Back Pain Scale 0-10: 0  Right leg pain: 0  Left leg pain: 0  Neck Pain Scale 0-10: 2  Right arm pain: 2  Left arm pain: 0    Promis 10 Assessment        7/3/2023    11:58 AM   PROMIS 10   In general, would you say your health is: Very good   In general, would you say your quality of life is: Excellent   In general, how would you rate your physical health? Very good   In general, how would you rate your mental health, including your mood and your ability to think? Excellent   In general, how would you rate your satisfaction with your social activities and relationships? Excellent   In general, please rate how well you carry out your usual social activities and roles Excellent   To what extent are you able to carry out your everyday physical activities such as walking, climbing stairs, carrying groceries, or moving a chair? Completely   In the past 7 days, how often have you been bothered by emotional problems such as feeling anxious,  depressed, or irritable? Never   In the past 7 days, how would you rate your fatigue on average? None   In the past 7 days, how would you rate your pain on average, where 0 means no pain, and 10 means worst imaginable pain? 0   In general, would you say your health is: 4   In general, would you say your quality of life is: 5   In general, how would you rate your physical health? 4   In general, how would you rate your mental health, including your mood and your ability to think? 5   In general, how would you rate your satisfaction with your social activities and relationships? 5   In general, please rate how well you carry out your usual social activities and roles. (This includes activities at home, at work and in your community, and responsibilities as a parent, child, spouse, employee, friend, etc.) 5   To what extent are you able to carry out your everyday physical activities such as walking, climbing stairs, carrying groceries, or moving a chair? 5   In the past 7 days, how often have you been bothered by emotional problems such as feeling anxious, depressed, or irritable? 1   In the past 7 days, how would you rate your fatigue on average? 1   In the past 7 days, how would you rate your pain on average, where 0 means no pain, and 10 means worst imaginable pain? 0   Global Mental Health Score 20   Global Physical Health Score 19   PROMIS TOTAL - SUBSCORES 39                Lara Blue LPN

## 2023-10-06 ENCOUNTER — ANCILLARY PROCEDURE (OUTPATIENT)
Dept: CT IMAGING | Facility: CLINIC | Age: 58
End: 2023-10-06
Attending: INTERNAL MEDICINE
Payer: COMMERCIAL

## 2023-10-06 DIAGNOSIS — B39.2 PULMONARY HISTOPLASMOSIS (H): ICD-10-CM

## 2023-10-06 PROCEDURE — 71250 CT THORAX DX C-: CPT | Mod: GC | Performed by: RADIOLOGY

## 2023-12-27 ENCOUNTER — TELEPHONE (OUTPATIENT)
Dept: PULMONOLOGY | Facility: CLINIC | Age: 58
End: 2023-12-27
Payer: COMMERCIAL

## 2023-12-27 NOTE — TELEPHONE ENCOUNTER
Left Voicemail (1st Attempt) for the patient to call back and schedule the following:    Appointment type: [facility]  Provider: RAN  Return date: 01/04/24  Specialty phone number: [phone]  Additional appointment(s) needed: N/A   Additonal Notes: N/A

## 2023-12-29 ENCOUNTER — TELEPHONE (OUTPATIENT)
Dept: PULMONOLOGY | Facility: CLINIC | Age: 58
End: 2023-12-29
Payer: COMMERCIAL

## 2023-12-29 NOTE — TELEPHONE ENCOUNTER
Left Voicemail (2nd Attempt) for the patient to call back and schedule the following:    Appointment type: [facility]  Provider: RAN  Return date: 01/2024  Specialty phone number: [phone]  Additional appointment(s) needed: NA  Additonal Notes: NA

## 2024-07-13 ENCOUNTER — HEALTH MAINTENANCE LETTER (OUTPATIENT)
Age: 59
End: 2024-07-13

## 2025-07-19 ENCOUNTER — HEALTH MAINTENANCE LETTER (OUTPATIENT)
Age: 60
End: 2025-07-19

## (undated) DEVICE — SOL NACL 0.9% IRRIG 1000ML BOTTLE 2F7124

## (undated) DEVICE — ENDO VALVE SYR NDL KIT ULTRASOUND BRONCH NA-201SX-4022-A

## (undated) DEVICE — CUSTOM PROCEDURE KIT

## (undated) DEVICE — ENDO VALVE SUCTION ULTRASOUND BRONCH MAJ-1414

## (undated) DEVICE — CONNECTOR SWIVEL 7.0MM ION 490108

## (undated) DEVICE — LABEL MEDICATION SYSTEM 3303-P

## (undated) DEVICE — NDL 18GA 1.5" 305196

## (undated) DEVICE — NDL BIOPSY 21G FLEXISION ION 490103

## (undated) DEVICE — PITCHER STERILE 1000ML  SSK9004A

## (undated) DEVICE — DRSG TELFA 3X8" 1238

## (undated) DEVICE — ADAPTER PROBE/SUCTION VISION ION 490101

## (undated) DEVICE — KIT ENDO FIRST STEP DISINFECTANT 200ML W/POUCH EP-4

## (undated) DEVICE — ENDO VALVE SUCTION BRONCH EVIS MAJ-209

## (undated) DEVICE — ENDO FORCEP ENDOJAW BIOPSY 2.0MMX155CM FB-221K

## (undated) DEVICE — SYR 30ML SLIP TIP W/O NDL 302833

## (undated) DEVICE — ANTIFOG SOLUTION W/FOAM PAD 31142527

## (undated) DEVICE — TUBING SUCTION 10'X3/16" N510

## (undated) DEVICE — SYR 10ML SLIP TIP W/O NDL 303134

## (undated) RX ORDER — LIDOCAINE HYDROCHLORIDE 10 MG/ML
INJECTION, SOLUTION EPIDURAL; INFILTRATION; INTRACAUDAL; PERINEURAL
Status: DISPENSED
Start: 2023-04-20

## (undated) RX ORDER — FENTANYL CITRATE 50 UG/ML
INJECTION, SOLUTION INTRAMUSCULAR; INTRAVENOUS
Status: DISPENSED
Start: 2023-03-21

## (undated) RX ORDER — FENTANYL CITRATE 50 UG/ML
INJECTION, SOLUTION INTRAMUSCULAR; INTRAVENOUS
Status: DISPENSED
Start: 2023-04-20

## (undated) RX ORDER — SODIUM CHLORIDE 9 MG/ML
INJECTION, SOLUTION INTRAVENOUS
Status: DISPENSED
Start: 2023-04-20